# Patient Record
Sex: MALE | Race: WHITE | NOT HISPANIC OR LATINO | Employment: OTHER | URBAN - METROPOLITAN AREA
[De-identification: names, ages, dates, MRNs, and addresses within clinical notes are randomized per-mention and may not be internally consistent; named-entity substitution may affect disease eponyms.]

---

## 2021-01-04 ENCOUNTER — APPOINTMENT (OUTPATIENT)
Dept: CT IMAGING | Facility: HOSPITAL | Age: 81
End: 2021-01-04

## 2021-01-04 ENCOUNTER — HOSPITAL ENCOUNTER (OUTPATIENT)
Facility: HOSPITAL | Age: 81
Setting detail: OBSERVATION
Discharge: HOME | End: 2021-01-05
Attending: EMERGENCY MEDICINE | Admitting: HOSPITALIST

## 2021-01-04 ENCOUNTER — APPOINTMENT (OUTPATIENT)
Dept: GENERAL RADIOLOGY | Facility: HOSPITAL | Age: 81
End: 2021-01-04

## 2021-01-04 DIAGNOSIS — G45.9 TIA (TRANSIENT ISCHEMIC ATTACK): Primary | ICD-10-CM

## 2021-01-04 PROBLEM — R47.9 SPEECH DISTURBANCE: Chronic | Status: ACTIVE | Noted: 2021-01-04

## 2021-01-04 LAB
ABO GROUP BLD: NORMAL
ALBUMIN SERPL-MCNC: 4.2 G/DL (ref 3.5–5.2)
ALBUMIN/GLOB SERPL: 1.3 G/DL
ALP SERPL-CCNC: 102 U/L (ref 39–117)
ALT SERPL W P-5'-P-CCNC: 12 U/L (ref 1–41)
ANION GAP SERPL CALCULATED.3IONS-SCNC: 13 MMOL/L (ref 5–15)
APTT PPP: 30 SECONDS (ref 24–31)
AST SERPL-CCNC: 17 U/L (ref 1–40)
BASOPHILS # BLD AUTO: 0.1 10*3/MM3 (ref 0–0.2)
BASOPHILS NFR BLD AUTO: 1.2 % (ref 0–1.5)
BILIRUB SERPL-MCNC: 0.8 MG/DL (ref 0–1.2)
BLD GP AB SCN SERPL QL: NEGATIVE
BUN SERPL-MCNC: 24 MG/DL (ref 8–23)
BUN/CREAT SERPL: 19.7 (ref 7–25)
CALCIUM SPEC-SCNC: 9.9 MG/DL (ref 8.6–10.5)
CHLORIDE SERPL-SCNC: 105 MMOL/L (ref 98–107)
CO2 SERPL-SCNC: 19 MMOL/L (ref 22–29)
CREAT SERPL-MCNC: 1.22 MG/DL (ref 0.76–1.27)
DEPRECATED RDW RBC AUTO: 49 FL (ref 37–54)
EOSINOPHIL # BLD AUTO: 0.1 10*3/MM3 (ref 0–0.4)
EOSINOPHIL NFR BLD AUTO: 0.6 % (ref 0.3–6.2)
ERYTHROCYTE [DISTWIDTH] IN BLOOD BY AUTOMATED COUNT: 15.2 % (ref 12.3–15.4)
GFR SERPL CREATININE-BSD FRML MDRD: 57 ML/MIN/1.73
GFR SERPL CREATININE-BSD FRML MDRD: 69 ML/MIN/1.73
GLOBULIN UR ELPH-MCNC: 3.2 GM/DL
GLUCOSE SERPL-MCNC: 151 MG/DL (ref 65–99)
HCT VFR BLD AUTO: 44.7 % (ref 37.5–51)
HGB BLD-MCNC: 15.1 G/DL (ref 13–17.7)
HOLD SPECIMEN: NORMAL
INR PPP: 1.08 (ref 0.93–1.1)
LYMPHOCYTES # BLD AUTO: 2.3 10*3/MM3 (ref 0.7–3.1)
LYMPHOCYTES NFR BLD AUTO: 28.2 % (ref 19.6–45.3)
MCH RBC QN AUTO: 31.4 PG (ref 26.6–33)
MCHC RBC AUTO-ENTMCNC: 33.8 G/DL (ref 31.5–35.7)
MCV RBC AUTO: 92.8 FL (ref 79–97)
MONOCYTES # BLD AUTO: 0.6 10*3/MM3 (ref 0.1–0.9)
MONOCYTES NFR BLD AUTO: 7.4 % (ref 5–12)
NEUTROPHILS NFR BLD AUTO: 5.1 10*3/MM3 (ref 1.7–7)
NEUTROPHILS NFR BLD AUTO: 62.6 % (ref 42.7–76)
NRBC BLD AUTO-RTO: 0.1 /100 WBC (ref 0–0.2)
PLATELET # BLD AUTO: 268 10*3/MM3 (ref 140–450)
PMV BLD AUTO: 8.9 FL (ref 6–12)
POTASSIUM SERPL-SCNC: 4.6 MMOL/L (ref 3.5–5.2)
PROT SERPL-MCNC: 7.4 G/DL (ref 6–8.5)
PROTHROMBIN TIME: 11.8 SECONDS (ref 9.6–11.7)
QT INTERVAL: 396 MS
RBC # BLD AUTO: 4.82 10*6/MM3 (ref 4.14–5.8)
RH BLD: POSITIVE
SODIUM SERPL-SCNC: 137 MMOL/L (ref 136–145)
T&S EXPIRATION DATE: NORMAL
TROPONIN T SERPL-MCNC: <0.01 NG/ML (ref 0–0.03)
WBC # BLD AUTO: 8.2 10*3/MM3 (ref 3.4–10.8)
WHOLE BLOOD HOLD SPECIMEN: NORMAL
WHOLE BLOOD HOLD SPECIMEN: NORMAL

## 2021-01-04 PROCEDURE — 71045 X-RAY EXAM CHEST 1 VIEW: CPT

## 2021-01-04 PROCEDURE — 86901 BLOOD TYPING SEROLOGIC RH(D): CPT

## 2021-01-04 PROCEDURE — 93005 ELECTROCARDIOGRAM TRACING: CPT | Performed by: EMERGENCY MEDICINE

## 2021-01-04 PROCEDURE — 96360 HYDRATION IV INFUSION INIT: CPT

## 2021-01-04 PROCEDURE — 80053 COMPREHEN METABOLIC PANEL: CPT | Performed by: EMERGENCY MEDICINE

## 2021-01-04 PROCEDURE — G0378 HOSPITAL OBSERVATION PER HR: HCPCS

## 2021-01-04 PROCEDURE — 70496 CT ANGIOGRAPHY HEAD: CPT

## 2021-01-04 PROCEDURE — 82962 GLUCOSE BLOOD TEST: CPT

## 2021-01-04 PROCEDURE — 85025 COMPLETE CBC W/AUTO DIFF WBC: CPT | Performed by: EMERGENCY MEDICINE

## 2021-01-04 PROCEDURE — 84484 ASSAY OF TROPONIN QUANT: CPT | Performed by: EMERGENCY MEDICINE

## 2021-01-04 PROCEDURE — 85610 PROTHROMBIN TIME: CPT | Performed by: EMERGENCY MEDICINE

## 2021-01-04 PROCEDURE — U0004 COV-19 TEST NON-CDC HGH THRU: HCPCS | Performed by: EMERGENCY MEDICINE

## 2021-01-04 PROCEDURE — 86901 BLOOD TYPING SEROLOGIC RH(D): CPT | Performed by: EMERGENCY MEDICINE

## 2021-01-04 PROCEDURE — 85730 THROMBOPLASTIN TIME PARTIAL: CPT | Performed by: EMERGENCY MEDICINE

## 2021-01-04 PROCEDURE — 70450 CT HEAD/BRAIN W/O DYE: CPT

## 2021-01-04 PROCEDURE — 96361 HYDRATE IV INFUSION ADD-ON: CPT

## 2021-01-04 PROCEDURE — 86850 RBC ANTIBODY SCREEN: CPT | Performed by: EMERGENCY MEDICINE

## 2021-01-04 PROCEDURE — C9803 HOPD COVID-19 SPEC COLLECT: HCPCS | Performed by: EMERGENCY MEDICINE

## 2021-01-04 PROCEDURE — 70498 CT ANGIOGRAPHY NECK: CPT

## 2021-01-04 PROCEDURE — 86900 BLOOD TYPING SEROLOGIC ABO: CPT | Performed by: EMERGENCY MEDICINE

## 2021-01-04 PROCEDURE — 99285 EMERGENCY DEPT VISIT HI MDM: CPT

## 2021-01-04 PROCEDURE — 86900 BLOOD TYPING SEROLOGIC ABO: CPT

## 2021-01-04 PROCEDURE — 0 IOPAMIDOL PER 1 ML: Performed by: EMERGENCY MEDICINE

## 2021-01-04 PROCEDURE — 99219 PR INITIAL OBSERVATION CARE/DAY 50 MINUTES: CPT | Performed by: PHYSICIAN ASSISTANT

## 2021-01-04 RX ORDER — MAGNESIUM SULFATE HEPTAHYDRATE 40 MG/ML
2 INJECTION, SOLUTION INTRAVENOUS AS NEEDED
Status: DISCONTINUED | OUTPATIENT
Start: 2021-01-04 | End: 2021-01-05 | Stop reason: HOSPADM

## 2021-01-04 RX ORDER — POTASSIUM CHLORIDE 1.5 G/1.77G
40 POWDER, FOR SOLUTION ORAL AS NEEDED
Status: DISCONTINUED | OUTPATIENT
Start: 2021-01-04 | End: 2021-01-05 | Stop reason: HOSPADM

## 2021-01-04 RX ORDER — CALCIUM GLUCONATE 20 MG/ML
2 INJECTION, SOLUTION INTRAVENOUS AS NEEDED
Status: DISCONTINUED | OUTPATIENT
Start: 2021-01-04 | End: 2021-01-05 | Stop reason: HOSPADM

## 2021-01-04 RX ORDER — ASPIRIN 300 MG/1
300 SUPPOSITORY RECTAL DAILY
Status: DISCONTINUED | OUTPATIENT
Start: 2021-01-04 | End: 2021-01-05

## 2021-01-04 RX ORDER — DOCUSATE SODIUM 100 MG/1
100 CAPSULE, LIQUID FILLED ORAL 2 TIMES DAILY PRN
Status: DISCONTINUED | OUTPATIENT
Start: 2021-01-04 | End: 2021-01-05 | Stop reason: HOSPADM

## 2021-01-04 RX ORDER — SODIUM CHLORIDE 9 MG/ML
125 INJECTION, SOLUTION INTRAVENOUS CONTINUOUS
Status: DISCONTINUED | OUTPATIENT
Start: 2021-01-04 | End: 2021-01-05 | Stop reason: HOSPADM

## 2021-01-04 RX ORDER — MAGNESIUM SULFATE HEPTAHYDRATE 40 MG/ML
4 INJECTION, SOLUTION INTRAVENOUS AS NEEDED
Status: DISCONTINUED | OUTPATIENT
Start: 2021-01-04 | End: 2021-01-05 | Stop reason: HOSPADM

## 2021-01-04 RX ORDER — SODIUM CHLORIDE 0.9 % (FLUSH) 0.9 %
10 SYRINGE (ML) INJECTION EVERY 12 HOURS SCHEDULED
Status: DISCONTINUED | OUTPATIENT
Start: 2021-01-04 | End: 2021-01-05 | Stop reason: HOSPADM

## 2021-01-04 RX ORDER — ONDANSETRON 2 MG/ML
4 INJECTION INTRAMUSCULAR; INTRAVENOUS EVERY 6 HOURS PRN
Status: DISCONTINUED | OUTPATIENT
Start: 2021-01-04 | End: 2021-01-05 | Stop reason: HOSPADM

## 2021-01-04 RX ORDER — ATORVASTATIN CALCIUM 40 MG/1
80 TABLET, FILM COATED ORAL NIGHTLY
Status: DISCONTINUED | OUTPATIENT
Start: 2021-01-04 | End: 2021-01-05 | Stop reason: HOSPADM

## 2021-01-04 RX ORDER — SODIUM CHLORIDE 0.9 % (FLUSH) 0.9 %
10 SYRINGE (ML) INJECTION AS NEEDED
Status: DISCONTINUED | OUTPATIENT
Start: 2021-01-04 | End: 2021-01-05 | Stop reason: HOSPADM

## 2021-01-04 RX ORDER — CALCIUM GLUCONATE 20 MG/ML
1 INJECTION, SOLUTION INTRAVENOUS AS NEEDED
Status: DISCONTINUED | OUTPATIENT
Start: 2021-01-04 | End: 2021-01-05 | Stop reason: HOSPADM

## 2021-01-04 RX ORDER — POTASSIUM CHLORIDE 20 MEQ/1
40 TABLET, EXTENDED RELEASE ORAL AS NEEDED
Status: DISCONTINUED | OUTPATIENT
Start: 2021-01-04 | End: 2021-01-05 | Stop reason: HOSPADM

## 2021-01-04 RX ORDER — ASPIRIN 81 MG/1
81 TABLET, CHEWABLE ORAL DAILY
Status: DISCONTINUED | OUTPATIENT
Start: 2021-01-04 | End: 2021-01-05

## 2021-01-04 RX ORDER — ALUMINA, MAGNESIA, AND SIMETHICONE 2400; 2400; 240 MG/30ML; MG/30ML; MG/30ML
7.5 SUSPENSION ORAL EVERY 4 HOURS PRN
Status: DISCONTINUED | OUTPATIENT
Start: 2021-01-04 | End: 2021-01-05 | Stop reason: HOSPADM

## 2021-01-04 RX ADMIN — SODIUM CHLORIDE 125 ML/HR: 9 INJECTION, SOLUTION INTRAVENOUS at 19:32

## 2021-01-04 RX ADMIN — IOPAMIDOL 100 ML: 755 INJECTION, SOLUTION INTRAVENOUS at 18:07

## 2021-01-05 ENCOUNTER — APPOINTMENT (OUTPATIENT)
Dept: CARDIOLOGY | Facility: HOSPITAL | Age: 81
End: 2021-01-05

## 2021-01-05 ENCOUNTER — APPOINTMENT (OUTPATIENT)
Dept: MRI IMAGING | Facility: HOSPITAL | Age: 81
End: 2021-01-05

## 2021-01-05 VITALS
RESPIRATION RATE: 18 BRPM | HEART RATE: 91 BPM | WEIGHT: 202.6 LBS | OXYGEN SATURATION: 93 % | DIASTOLIC BLOOD PRESSURE: 78 MMHG | HEIGHT: 71 IN | SYSTOLIC BLOOD PRESSURE: 136 MMHG | BODY MASS INDEX: 28.36 KG/M2 | TEMPERATURE: 97.5 F

## 2021-01-05 PROBLEM — Z95.1 HX OF CABG: Status: ACTIVE | Noted: 2021-01-05

## 2021-01-05 PROBLEM — I48.91 ATRIAL FIBRILLATION (HCC): Status: ACTIVE | Noted: 2021-01-05

## 2021-01-05 PROBLEM — E11.21 DIABETES MELLITUS WITH NEPHROPATHY (HCC): Status: ACTIVE | Noted: 2021-01-05

## 2021-01-05 PROBLEM — I65.22 INTERNAL CAROTID ARTERY OCCLUSION, LEFT: Status: ACTIVE | Noted: 2021-01-05

## 2021-01-05 PROBLEM — J44.9 COPD (CHRONIC OBSTRUCTIVE PULMONARY DISEASE) (HCC): Status: ACTIVE | Noted: 2021-01-05

## 2021-01-05 PROBLEM — N40.0 BPH (BENIGN PROSTATIC HYPERPLASIA): Status: ACTIVE | Noted: 2021-01-05

## 2021-01-05 PROBLEM — I71.40 AAA (ABDOMINAL AORTIC ANEURYSM) (HCC): Status: ACTIVE | Noted: 2021-01-05

## 2021-01-05 PROBLEM — G43.909 MIGRAINE HEADACHE: Status: ACTIVE | Noted: 2021-01-05

## 2021-01-05 PROBLEM — R47.9 SPEECH DISTURBANCE: Chronic | Status: RESOLVED | Noted: 2021-01-04 | Resolved: 2021-01-05

## 2021-01-05 LAB
ALBUMIN SERPL-MCNC: 4 G/DL (ref 3.5–5.2)
ALBUMIN/GLOB SERPL: 1.5 G/DL
ALP SERPL-CCNC: 98 U/L (ref 39–117)
ALT SERPL W P-5'-P-CCNC: 8 U/L (ref 1–41)
ANION GAP SERPL CALCULATED.3IONS-SCNC: 11 MMOL/L (ref 5–15)
AST SERPL-CCNC: 21 U/L (ref 1–40)
BILIRUB SERPL-MCNC: 0.9 MG/DL (ref 0–1.2)
BUN SERPL-MCNC: 23 MG/DL (ref 8–23)
BUN/CREAT SERPL: 18.3 (ref 7–25)
CALCIUM SPEC-SCNC: 9.4 MG/DL (ref 8.6–10.5)
CHLORIDE SERPL-SCNC: 104 MMOL/L (ref 98–107)
CHOLEST SERPL-MCNC: 89 MG/DL (ref 0–200)
CO2 SERPL-SCNC: 21 MMOL/L (ref 22–29)
CREAT SERPL-MCNC: 1.26 MG/DL (ref 0.76–1.27)
DEPRECATED RDW RBC AUTO: 49 FL (ref 37–54)
ERYTHROCYTE [DISTWIDTH] IN BLOOD BY AUTOMATED COUNT: 15.3 % (ref 12.3–15.4)
GFR SERPL CREATININE-BSD FRML MDRD: 55 ML/MIN/1.73
GLOBULIN UR ELPH-MCNC: 2.7 GM/DL
GLUCOSE BLDC GLUCOMTR-MCNC: 148 MG/DL (ref 70–105)
GLUCOSE BLDC GLUCOMTR-MCNC: 166 MG/DL (ref 70–105)
GLUCOSE BLDC GLUCOMTR-MCNC: 203 MG/DL (ref 70–105)
GLUCOSE SERPL-MCNC: 216 MG/DL (ref 65–99)
HBA1C MFR BLD: 6.5 % (ref 3.5–5.6)
HCT VFR BLD AUTO: 42.5 % (ref 37.5–51)
HDLC SERPL-MCNC: 40 MG/DL (ref 40–60)
HGB BLD-MCNC: 14.2 G/DL (ref 13–17.7)
LDLC SERPL CALC-MCNC: 30 MG/DL (ref 0–100)
LDLC/HDLC SERPL: 0.74 {RATIO}
MCH RBC QN AUTO: 30.6 PG (ref 26.6–33)
MCHC RBC AUTO-ENTMCNC: 33.4 G/DL (ref 31.5–35.7)
MCV RBC AUTO: 91.7 FL (ref 79–97)
PLATELET # BLD AUTO: 231 10*3/MM3 (ref 140–450)
PMV BLD AUTO: 8.9 FL (ref 6–12)
POTASSIUM SERPL-SCNC: 3.5 MMOL/L (ref 3.5–5.2)
PROT SERPL-MCNC: 6.7 G/DL (ref 6–8.5)
RBC # BLD AUTO: 4.63 10*6/MM3 (ref 4.14–5.8)
SARS-COV-2 ORF1AB RESP QL NAA+PROBE: NOT DETECTED
SODIUM SERPL-SCNC: 136 MMOL/L (ref 136–145)
TRIGL SERPL-MCNC: 98 MG/DL (ref 0–150)
TSH SERPL DL<=0.05 MIU/L-ACNC: 3.62 UIU/ML (ref 0.27–4.2)
VIT B12 BLD-MCNC: 573 PG/ML (ref 211–946)
VLDLC SERPL-MCNC: 19 MG/DL (ref 5–40)
WBC # BLD AUTO: 8 10*3/MM3 (ref 3.4–10.8)

## 2021-01-05 PROCEDURE — 80061 LIPID PANEL: CPT | Performed by: PHYSICIAN ASSISTANT

## 2021-01-05 PROCEDURE — G0378 HOSPITAL OBSERVATION PER HR: HCPCS

## 2021-01-05 PROCEDURE — 85027 COMPLETE CBC AUTOMATED: CPT | Performed by: PHYSICIAN ASSISTANT

## 2021-01-05 PROCEDURE — 83036 HEMOGLOBIN GLYCOSYLATED A1C: CPT | Performed by: PHYSICIAN ASSISTANT

## 2021-01-05 PROCEDURE — 99214 OFFICE O/P EST MOD 30 MIN: CPT | Performed by: NURSE PRACTITIONER

## 2021-01-05 PROCEDURE — 97535 SELF CARE MNGMENT TRAINING: CPT

## 2021-01-05 PROCEDURE — 82962 GLUCOSE BLOOD TEST: CPT

## 2021-01-05 PROCEDURE — 99217 PR OBSERVATION CARE DISCHARGE MANAGEMENT: CPT | Performed by: HOSPITALIST

## 2021-01-05 PROCEDURE — 80053 COMPREHEN METABOLIC PANEL: CPT | Performed by: PHYSICIAN ASSISTANT

## 2021-01-05 PROCEDURE — 97161 PT EVAL LOW COMPLEX 20 MIN: CPT

## 2021-01-05 PROCEDURE — 97165 OT EVAL LOW COMPLEX 30 MIN: CPT

## 2021-01-05 PROCEDURE — 70551 MRI BRAIN STEM W/O DYE: CPT

## 2021-01-05 PROCEDURE — 84443 ASSAY THYROID STIM HORMONE: CPT | Performed by: PHYSICIAN ASSISTANT

## 2021-01-05 PROCEDURE — 82607 VITAMIN B-12: CPT | Performed by: PHYSICIAN ASSISTANT

## 2021-01-05 RX ORDER — POLYETHYLENE GLYCOL 3350 17 G/17G
17 POWDER, FOR SOLUTION ORAL DAILY PRN
COMMUNITY

## 2021-01-05 RX ORDER — INSULIN GLARGINE 100 [IU]/ML
18 INJECTION, SOLUTION SUBCUTANEOUS NIGHTLY
COMMUNITY

## 2021-01-05 RX ORDER — CARBOXYMETHYLCELLULOSE SODIUM 5 MG/ML
1 SOLUTION/ DROPS OPHTHALMIC 4 TIMES DAILY PRN
COMMUNITY

## 2021-01-05 RX ORDER — TAMSULOSIN HYDROCHLORIDE 0.4 MG/1
2 CAPSULE ORAL NIGHTLY
COMMUNITY

## 2021-01-05 RX ORDER — ECHINACEA PURPUREA EXTRACT 125 MG
1 TABLET ORAL EVERY 6 HOURS PRN
COMMUNITY

## 2021-01-05 RX ORDER — CLOPIDOGREL BISULFATE 75 MG/1
75 TABLET ORAL DAILY
Status: DISCONTINUED | OUTPATIENT
Start: 2021-01-05 | End: 2021-01-05 | Stop reason: HOSPADM

## 2021-01-05 RX ORDER — FERROUS GLUCONATE 324(37.5)
324 TABLET ORAL EVERY OTHER DAY
COMMUNITY

## 2021-01-05 RX ORDER — GABAPENTIN 100 MG/1
200 CAPSULE ORAL 2 TIMES DAILY
COMMUNITY

## 2021-01-05 RX ORDER — ALBUTEROL SULFATE 90 UG/1
2 AEROSOL, METERED RESPIRATORY (INHALATION) EVERY 6 HOURS PRN
COMMUNITY

## 2021-01-05 RX ORDER — ATORVASTATIN CALCIUM 40 MG/1
40 TABLET, FILM COATED ORAL DAILY
COMMUNITY

## 2021-01-05 RX ORDER — METOPROLOL SUCCINATE 25 MG/1
12.5 TABLET, EXTENDED RELEASE ORAL DAILY
COMMUNITY

## 2021-01-05 RX ORDER — AMOXICILLIN 250 MG
1 CAPSULE ORAL NIGHTLY PRN
COMMUNITY

## 2021-01-05 RX ORDER — MELATONIN
1000 DAILY
COMMUNITY

## 2021-01-05 RX ORDER — CLOPIDOGREL BISULFATE 75 MG/1
75 TABLET ORAL DAILY
Qty: 30 TABLET | Refills: 0 | Status: SHIPPED | OUTPATIENT
Start: 2021-01-06 | End: 2021-01-05

## 2021-01-05 RX ORDER — ACETAMINOPHEN 325 MG/1
650 TABLET ORAL EVERY 6 HOURS PRN
COMMUNITY

## 2021-01-05 RX ORDER — FINASTERIDE 5 MG/1
5 TABLET, FILM COATED ORAL DAILY
COMMUNITY

## 2021-01-05 RX ORDER — CLOPIDOGREL BISULFATE 75 MG/1
75 TABLET ORAL DAILY
Qty: 30 TABLET | Refills: 0 | Status: SHIPPED | OUTPATIENT
Start: 2021-01-06

## 2021-01-05 RX ADMIN — Medication 10 ML: at 00:33

## 2021-01-05 RX ADMIN — CLOPIDOGREL BISULFATE 75 MG: 75 TABLET ORAL at 12:13

## 2021-01-05 RX ADMIN — ASPIRIN 81 MG CHEWABLE TABLET 81 MG: 81 TABLET CHEWABLE at 00:32

## 2021-01-05 RX ADMIN — ATORVASTATIN CALCIUM 80 MG: 40 TABLET, FILM COATED ORAL at 00:32

## 2021-01-05 NOTE — DISCHARGE SUMMARY
[facility] Medicine Services  DISCHARGE SUMMARY        Prepared For PCP:  Provider, No Known    Patient Name: Lucio Valladares  : 1940  MRN: [id number]      Date of Admission:   2021    Date of Discharge:  2021    Length of stay:  LOS: 0 days     Hospital Course     Presenting Problem:   TIA (transient ischemic attack) [G45.9]      Active Hospital Problems    Diagnosis  POA   • **TIA (transient ischemic attack) [G45.9]  Yes     Priority: High   • Diabetes mellitus with nephropathy (CMS/HCC) [E11.21]  Yes     Priority: Medium   • BPH (benign prostatic hyperplasia) [N40.0]  Yes     Priority: Medium   • Hx of CABG [Z95.1]  Not Applicable     Priority: Medium   • AAA (abdominal aortic aneurysm) (CMS/[facility]) [I71.4]  Yes     Priority: Medium   • Migraine headache [G43.909]  Yes     Priority: Medium   • Internal carotid artery occlusion, left [I65.22]  Yes     Priority: Medium   • COPD (chronic obstructive pulmonary disease) (CMS/[facility]) [J44.9]  Yes     Priority: Medium   • Atrial fibrillation (CMS/[facility]) [I48.91]  Yes     Priority: Medium      Resolved Hospital Problems    Diagnosis Date Resolved POA   • Speech disturbance [R47.9] 2021 Yes     Priority: High           Hospital Course:    The patient was placed on observation.  CTA head/neck were done and showed known occlusion of the left ICA and 50% occlusion of right ICA.  MRI of the brain ruled out acute infarct but showed old infarct in the right frontal lobe.  PT/OT has evaluated the patient.  The patient at baseline uses wheelchair/scooter for several years and has issues with chronic knee and ankle pain.  The patient will be discharged back to [facility] on 2021.  The patient's daughter agrees with plan of care.  The patient can follow-up with neuro interventionist regarding left ICA occlusion.    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:  -The patient had followed up with surgeon 2019 and determined to be poor surgical candidate according to daughter    BPH:  -Continue Flomax and finasteride    IDDM with peripheral neuropathy:  -Continue Metformin, Lantus and gabapentin  -Hemoglobin A1c 6.5    COPD:  -Not in exacerbation  -Quit smoking 2014  -Continue bronchodilators    Chronic LE pain/gait dysfunction:  -Patient has history of knee repair, knee DJD and ankle pain  -Uses wheelchair/walker to ambulate for many years    Dementia:  -Mild described by daughter             Recommendation for Outpatient Providers:             Reasons For Change In Medications and Indications for New Medications:        Day of Discharge     HPI:     Mr. Valladares is a 80 y.o. male with an uncertain past medical history who presents to [facility] after family noticed what they felt was a change in his speech.  Patient is alert and oriented x2 and appears somewhat agitated that he is at the hospital.  He states that his daughter is his POA although he did not want her to be and she is trying to interfere with his life including preventing him from traveling because she \"thinks I have dementia\".  At the time my exam patient speech does appear mildly slurred though he states that it is at baseline and denies any complaint including focal weakness, recent falls, headache or any other pain, dyspnea, nausea vomiting, change in bowel or bladder habits, sensation of tachycardia or palpitations, syncope or near syncope.  He " notes that he had a previous CVA approximately 2 to 3 years ago and uses some mobility aids at home including a cane and a walker but is otherwise able to live independently with his daughter checking on him periodically.  He cannot provide any further information regarding his medical history or medications he takes regularly other than to say [facility]     In the ED patient had lab significant for creatinine: 1.22, BUN: 24, BUN/creatinine ratio: 19.7, glucose: 151 remainder of CBC and CMP was generally unremarkable.  INR: 1.08, PT: 11.8, PTT: 30.0.  Chest x-ray shows no acute cardiopulmonary abnormality.  CT of head without contrast shows no acute intracranial abnormality with a remote infarct at the right frontal lobe noted as well as mild age-appropriate volume loss with a moderate amount of low-density in the periventricular subcortical white matter.  Radiologist notes this is nonspecific but most commonly with a result of small vessel ischemic changes.  CTA of head and neck is pending.  EKG shows sinus rhythm at 67 without obvious acute ST changes or ectopy and a QTC of 411 ms.       Vital Signs:   Temp:  [97.3 °F (36.3 °C)-98.9 °F (37.2 °C)] 97.5 °F (36.4 °C)  Heart Rate:  [] 91  Resp:  [13-22] 18  BP: (105-154)/(59-87) 136/78     Physical Exam:  Physical Exam  HENT:      Head: Normocephalic.      Mouth/Throat:      Mouth: Mucous membranes are moist.   Eyes:      General: No scleral icterus.     Extraocular Movements: Extraocular movements intact.      Pupils: Pupils are equal, round, and reactive to light.   Neck:      Musculoskeletal: Normal range of motion.   Cardiovascular:      Rate and Rhythm: Normal rate and regular rhythm.   Pulmonary:      Effort: Pulmonary effort is normal.      Breath sounds: Normal breath sounds.   Abdominal:      General: Bowel sounds are normal.      Palpations: Abdomen is soft.   Neurological:      Mental Status: He is alert.    Psychiatric:      Comments: dementia         Pertinent  and/or Most Recent Results     Results from last 7 days   Lab Units 01/05/21  0445 01/05/21  0444 01/04/21  1802   WBC 10*3/mm3 8.00  --  8.20   HEMOGLOBIN g/dL 14.2  --  15.1   HEMATOCRIT % 42.5  --  44.7   PLATELETS 10*3/mm3 231  --  268   SODIUM mmol/L  --  136 137   POTASSIUM mmol/L  --  3.5 4.6   CHLORIDE mmol/L  --  104 105   CO2 mmol/L  --  21.0* 19.0*   BUN mg/dL  --  23 24*   CREATININE mg/dL  --  1.26 1.22   GLUCOSE mg/dL  --  216* 151*   CALCIUM mg/dL  --  9.4 9.9     Results from last 7 days   Lab Units 01/05/21  0444 01/04/21  1802   BILIRUBIN mg/dL 0.9 0.8   ALK PHOS U/L 98 102   ALT (SGPT) U/L 8 12   AST (SGOT) U/L 21 17   PROTIME Seconds  --  11.8*   INR   --  1.08   APTT seconds  --  30.0     Results from last 7 days   Lab Units 01/05/21  0444   CHOLESTEROL mg/dL 89   TRIGLYCERIDES mg/dL 98   HDL CHOL mg/dL 40     Results from last 7 days   Lab Units 01/05/21  0445 01/05/21  0444 01/04/21  1802   TSH uIU/mL  --  3.620  --    HEMOGLOBIN A1C % 6.5*  --   --    TROPONIN T ng/mL  --   --  <0.010       Brief Urine Lab Results     None          Microbiology Results Abnormal     None          Ct Angiogram Neck    Result Date: 1/5/2021  Impression:  1. No obvious acute large vessel cerebral arterial occlusions appreciated. 2. Complete occlusion, left internal carotid artery, age indeterminate. 3. Absence versus occlusion, terminal most left vertebral artery. The right vertebral artery is widely patent and dominant in caliber. The basilar artery is also widely patent. 4. Moderate grade stenosis, prevertebral proximal left subclavian artery. 5. Additional findings, both vascular and nonvascular, as described above detail.  Electronically Signed By-Heath Allan MD On:1/5/2021 7:53 AM This report was finalized on [id number] by  Heath Allan MD.    Mri Brain Without Contrast    Result Date: 1/5/2021  Impression:  1. No acute intracranial  abnormality. 2. Advanced chronic small vessel ischemic change. 3. Chronic right frontal lobe infarct. 4. Redemonstration of left internal carotid artery occlusion as seen on CTA. 5. Small left mastoid effusion.  Electronically Signed By-Jesus Meeks MD On:1/5/2021 11:57 AM This report was finalized on [id number] by  Jesus Meeks MD.    Xr Chest 1 View    Result Date: 1/4/2021  Impression: No acute cardiopulmonary abnormality.  Electronically Signed By-Misti Spain MD On:1/4/2021 6:46 PM This report was finalized on [id number] by  Misti Spain MD.    Ct Angiogram Head    Result Date: 1/5/2021  Impression:  1. No obvious acute large vessel cerebral arterial occlusions appreciated. 2. Complete occlusion, left internal carotid artery, age indeterminate. 3. Absence versus occlusion, terminal most left vertebral artery. The right vertebral artery is widely patent and dominant in caliber. The basilar artery is also widely patent. 4. Moderate grade stenosis, prevertebral proximal left subclavian artery. 5. Additional findings, both vascular and nonvascular, as described above detail.  Electronically Signed By-Heath Allan MD On:1/5/2021 7:53 AM This report was finalized on [id number] by  Heath Allan MD.    Ct Head Without Contrast Stroke Protocol    Result Date: 1/4/2021  Impression: No acute intracranial abnormality.. There is remote infarct at the right frontal lobe. There is mild age-appropriate volume loss and there is a moderate amount of low-density in the periventricular subcortical white matter. This low density is nonspecific, but most commonly seen with chronic small vessel ischemic change. Brain MRI is more sensitive to evaluate for acute or subacute infarcts and to evaluate for intracranial metastatic disease.  Electronically Signed By-Misti Spain MD On:1/4/2021 6:08 PM This report was finalized on [id number] by  Misti Spain MD.                             Test Results Pending at  Discharge  Pending Labs     Order Current Status    COVID PRE-OP / PRE-PROCEDURE SCREENING ORDER (NO ISOLATION) - Swab, Nasopharynx In process    COVID-19,APTIMA PANTHER,[facility] IN-HOUSE, NP/OP SWAB IN UTM/VTM/SALINE TRANSPORT MEDIA,24 HR TAT - Swab, Nasopharynx In process            Procedures Performed           Consults:   Consults     Date and Time Order Name Status Description    1/4/2021 1944 Hospitalist (on-call MD unless specified) Completed     1/4/2021 1758 Inpatient Neurology Consult Stroke Completed     1/4/2021 1758 Inpatient Neurology Consult Stroke Completed             Discharge Details        Discharge Medications      New Medications      Instructions Start Date   clopidogrel 75 MG tablet  Commonly known as: PLAVIX   75 mg, Oral, Daily   Start Date: January 6, 2021        Continue These Medications      Instructions Start Date   acetaminophen 325 MG tablet  Commonly known as: TYLENOL   650 mg, Oral, Every 6 Hours PRN      albuterol sulfate  (90 Base) MCG/ACT inhaler  Commonly known as: PROVENTIL HFA;VENTOLIN HFA;PROAIR HFA   2 puffs, Inhalation, Every 6 Hours PRN      apixaban 5 MG tablet tablet  Commonly known as: ELIQUIS   5 mg, Oral, 2 Times Daily      atorvastatin 40 MG tablet  Commonly known as: LIPITOR   40 mg, Oral, Daily      carboxymethylcellulose 0.5 % solution  Commonly known as: REFRESH PLUS   1 drop, Both Eyes, 4 Times Daily PRN      cholecalciferol 25 MCG (1000 UT) tablet  Commonly known as: VITAMIN D3   1,000 Units, Oral, Daily      Diclofenac Sodium 1 % gel gel  Commonly known as: VOLTAREN   4 g, Topical, 4 Times Daily PRN      ferrous gluconate 324 (37.5 Fe) MG tablet tablet   324 mg, Oral, Every Other Day      finasteride 5 MG tablet  Commonly known as: PROSCAR   5 mg, Oral, Daily      gabapentin 100 MG capsule  Commonly known as: NEURONTIN   200 mg, Oral, 2 times daily      insulin glargine 100 UNIT/ML injection  Commonly known as: LANADELINA MANSFIELDGLEE   18 Units, Subcutaneous,  Nightly      metFORMIN 500 MG tablet  Commonly known as: GLUCOPHAGE   500 mg, Oral, 2 Times Daily With Meals      metoprolol succinate XL 25 MG 24 hr tablet  Commonly known as: TOPROL-XL   12.5 mg, Oral, Daily      polyethylene glycol 17 g packet  Commonly known as: MIRALAX   17 g, Oral, Daily PRN      sennosides-docusate 8.6-50 MG per tablet  Commonly known as: PERICOLACE   1 tablet, Oral, Nightly PRN      sodium chloride 0.65 % nasal spray   1 spray, Nasal, Every 6 Hours PRN      tamsulosin 0.4 MG capsule 24 hr capsule  Commonly known as: FLOMAX   2 capsules, Oral, Nightly      tiotropium bromide-olodaterol 2.5-2.5 MCG/ACT aerosol solution inhaler  Commonly known as: STIOLTO RESPIMAT   2 puffs, Inhalation, Daily - RT             No Known Allergies      Discharge Disposition:  Home or Self Care    Diet:  Hospital:  Diet Order   Procedures   • Diet Regular         Discharge Activity:         CODE STATUS:    Code Status and Medical Interventions:   Ordered at: 01/04/21 2015     Code Status:    CPR     Medical Interventions (Level of Support Prior to Arrest):    Full         Follow-up Appointments  No future appointments.    Additional Instructions for the Follow-ups that You Need to Schedule     Discharge Follow-up with PCP   As directed       Currently Documented PCP:    Provider, No Known    PCP Phone Number:    None     Follow Up Details: 2 weeks         Discharge Follow-up with Specified Provider: neuro-interventionist at [facility]; 3 Weeks   As directed      To: neuro-interventionist at [facility]    Follow Up: 3 Weeks    Follow Up Details: Internal carotid occlusion                 Condition on Discharge:      Stable      This patient has been examined wearing appropriate Personal Protective Equipment and discussed with nursing. 01/05/21      Electronically signed by Kvng Pack DO, 01/05/21, 2:12 PM EST.      Time: I spent  15  minutes on this discharge activity which included  face-to-face encounter with the patient/reviewing the data in the system/coordination of the care with the nursing staff as well as consultants/documentation/entering orders.

## 2021-01-05 NOTE — THERAPY EVALUATION
Patient Name: Lucio Valladares  : 1940    MRN: [id number]                              Today's Date: 2021       Admit Date: 2021    Visit Dx:     ICD-10-CM ICD-9-CM   1. TIA (transient ischemic attack)  G45.9 435.9     Patient Active Problem List   Diagnosis   • TIA (transient ischemic attack)   • Diabetes mellitus with nephropathy (CMS/HCC)   • BPH (benign prostatic hyperplasia)     History reviewed. No pertinent past medical history.  History reviewed. No pertinent surgical history.  General Information     Row Name 21 1354          OT Time and Intention    Document Type  evaluation  -ES     Mode of Treatment  occupational therapy  -ES     Row Name 21 [facility]          General Information    Patient Profile Reviewed  yes  -ES     Existing Precautions/Restrictions  fall  -ES     Barriers to Rehab  cognitive status  -ES     Row Name 21 [facility]          Occupational Profile    Reason for Services/Referral (Occupational Profile)  Pt is 79 yo male admitted for dysarthria from [facility]. CT and MRI (-) for acute infarct. Remote infarct to R frontal lobe.  -ES     Successful Occupations (Occupational Profile)  Reports being [place] [employer]  -ES     Row Name 21 1353          Living Environment    Lives With  facility resident [facility]  -     Row Name 21 135          Home Main Entrance    Number of Stairs, Main Entrance  none  -ES     Row Name 21 1354          Stairs Within Home, Primary    Number of Stairs, Within Home, Primary  none  -ES     Row Name 21 1355          Cognition    Orientation Status (Cognition)  oriented to;person  -ES     Row Name 21 1356          Safety Issues, Functional Mobility    Safety Issues Affecting Function (Mobility)  awareness of need for assistance;ability to follow commands;insight into deficits/self-awareness;positioning of assistive device;safety precaution awareness;steps too close to assistive device;sequencing abilities  -ES      Impairments Affecting Function (Mobility)  balance;cognition  -ES     Cognitive Impairments, Mobility Safety/Performance  attention;impulsivity;insight into deficits/self-awareness;judgment;problem-solving/reasoning;safety precaution awareness;safety precaution follow-through  -ES       User Key  (r) = Recorded By, (t) = Taken By, (c) = Cosigned By    Initials Name Provider Type    Cierra Chung OT Occupational Therapist          Mobility/ADL's     Row Name 01/05/21 1358          Bed Mobility    Comment (Bed Mobility)  Up in chair upon OT arrival  -     Row Name 01/05/21 1358          Transfers    Bed-Chair [place] (Transfers)  minimum assist (75% patient effort)  -ES     Sit-Stand [place] (Transfers)  minimum assist (75% patient effort);verbal cues;nonverbal cues (demo/gesture)  -     Row Name 01/05/21 1358          Sit-Stand Transfer    Assistive Device (Sit-Stand Transfers)  walker, front-wheeled  -     Row Name 01/05/21 1358          Functional Mobility    Functional Mobility- Ind. Level  minimum assist (75% patient effort)  -     Functional Mobility- Device  rolling walker  -     Row Name 01/05/21 1358          Activities of Daily Living    BADL Assessment/Intervention  upper body dressing;lower body dressing  -     Row Name 01/05/21 1358          Upper Body Dressing Assessment/Training    [place] Level (Upper Body Dressing)  minimum assist (75% patient effort)  -     Row Name 01/05/21 1358          Lower Body Dressing Assessment/Training    [place] Level (Lower Body Dressing)  moderate assist (50% patient effort)  -ES       User Key  (r) = Recorded By, (t) = Taken By, (c) = Cosigned By    Initials Name Provider Type    Cierra Chung OT Occupational Therapist        Obj/Interventions     Row Name 01/05/21 1400          Sensory Assessment (Somatosensory)    Sensory Assessment (Somatosensory)  -- responsive to light touch, appears grossly WFL  -     Row Name  01/05/21 1400          Vision Assessment/Intervention    Visual Impairment/Limitations  -- unable to assess secondary to cognitive deficits  -ES     Row Name 01/05/21 1400          Range of Motion Comprehensive    General Range of Motion  no range of motion deficits identified  -ES     Row Name 01/05/21 1400          Strength Comprehensive (MMT)    General Manual Muscle Testing (MMT) Assessment  no strength deficits identified  -ES     Row Name 01/05/21 1400          Balance    Balance Assessment  sitting static balance;sitting dynamic balance;sit to stand dynamic balance;standing static balance;standing dynamic balance  -ES     Static Sitting Balance  WNL  -ES     Dynamic Sitting Balance  WNL  -ES     Sit to Stand Dynamic Balance  mild impairment  -ES     Static Standing Balance  mild impairment  -ES     Dynamic Standing Balance  mild impairment  -ES       User Key  (r) = Recorded By, (t) = Taken By, (c) = Cosigned By    Initials Name Provider Type    Cierra Chung OT Occupational Therapist        Goals/Plan     Row Name 01/05/21 1405          Dressing Goal 1 (OT)    Activity/Device (Dressing Goal 1, OT)  lower body dressing  -ES     [place]/Cues Needed (Dressing Goal 1, OT)  contact guard assist  -ES     Time Frame (Dressing Goal 1, OT)  2 weeks  -ES     Row Name 01/05/21 1405          Toileting Goal 1 (OT)    Activity/Device (Toileting Goal 1, OT)  toileting skills, all  -ES     [place] Level/Cues Needed (Toileting Goal 1, OT)  supervision required  -ES     Time Frame (Toileting Goal 1, OT)  2 weeks  -ES     Row Name 01/05/21 1405          Grooming Goal 1 (OT)    Activity/Device (Grooming Goal 1, OT)  grooming skills, all  -ES     [place] (Grooming Goal 1, OT)  modified independence  -ES     Time Frame (Grooming Goal 1, OT)  2 weeks  -ES       User Key  (r) = Recorded By, (t) = Taken By, (c) = Cosigned By    Initials Name Provider Type    Cierra Chung OT Occupational Therapist     "    Clinical Impression     Row Name 01/05/21 1400          Pain Scale: Numbers Pre/Post-Treatment    Pretreatment Pain Rating  0/10 - no pain  -ES     Posttreatment Pain Rating  0/10 - no pain  -ES     Row Name 01/05/21 1400          Plan of Care Review    Plan of Care Reviewed With  patient  -ES     Outcome Summary  Pt is 79 yo male admitted for dysarthria from [facility]. CT and MRI (-) for acute infarct. Remote infarct to R frontal lobe.  Pt pleasantly confused, and difficult to obtain PLOF.  He is oriented to self, and states he is at \"[employer]\" and is loquatious but unable to maintain attention to task.  Pt requires Min A for functional transfer and mobility, and demos poor safety awareness. He would benefit from IP rehab to addressed safety impairments. PPE: Gloves, mask, eyewear  -ES     Row Name 01/05/21 1400          Therapy Assessment/Plan (OT)    Rehab Potential (OT)  good, to achieve stated therapy goals  -ES     Criteria for Skilled Therapeutic Interventions Met (OT)  yes  -ES     Therapy Frequency (OT)  5 times/wk  -ES     Row Name 01/05/21 1400          Therapy Plan Review/Discharge Plan (OT)    Anticipated Discharge Disposition (OT)  inpatient rehabilitation facility  -ES     Row Name 01/05/21 1400          Vital Signs    Pre SpO2 (%)  95  -ES     O2 Delivery Pre Treatment  room air  -ES     Pre Patient Position  Sitting  -ES     Intra Patient Position  Standing  -ES     Post Patient Position  Sitting  -ES     Recovery Time  VSS  -ES     Row Name 01/05/21 1400          Positioning and Restraints    Pre-Treatment Position  sitting in chair/recliner  -ES     Post Treatment Position  chair  -ES     In Chair  notified nsg;exit alarm on;with family/caregiver;with OT  -ES       User Key  (r) = Recorded By, (t) = Taken By, (c) = Cosigned By    Initials Name Provider Type    Cierra Chung, OT Occupational Therapist        Outcome Measures     Row Name 01/05/21 1407          How much help from another " "is currently needed...    Putting on and taking off regular lower body clothing?  3  -ES     Bathing (including washing, rinsing, and drying)  3  -ES     Toileting (which includes using toilet bed pan or urinal)  3  -ES     Putting on and taking off regular upper body clothing  3  -ES     Taking care of personal grooming (such as brushing teeth)  3  -ES     Eating meals  4  -ES     AM-PAC 6 Clicks Score (OT)  19  -ES     Row Name 01/05/21 1405          Functional Assessment    Outcome Measure Options  AM-PAC 6 Clicks Daily Activity (OT)  -ES       User Key  (r) = Recorded By, (t) = Taken By, (c) = Cosigned By    Initials Name Provider Type    Cierra Chung OT Occupational Therapist          OT Recommendation and Plan  Therapy Frequency (OT): 5 times/wk  Plan of Care Review  Plan of Care Reviewed With: patient  Outcome Summary: Pt is 81 yo male admitted for dysarthria from [facility]. CT and MRI (-) for acute infarct. Remote infarct to R frontal lobe.  Pt pleasantly confused, and difficult to obtain PLOF.  He is oriented to self, and states he is at \"[employer]\" and is loquatious but unable to maintain attention to task.  Pt requires Min A for functional transfer and mobility, and demos poor safety awareness. He would benefit from IP rehab to addressed safety impairments. PPE: Gloves, mask, eyewear     Time Calculation:   Time Calculation- OT     Row Name 01/05/21 1406             Time Calculation- OT    OT Start Time  1215  -ES      OT Stop Time  1240  -ES      OT Time Calculation (min)  25 min  -ES      Total Timed Code Minutes- OT  10 minute(s)  -ES      OT Non-Billable Time (min)  15 min  -ES      OT Received On  01/05/21  -ES      OT - Next Appointment  01/06/21  -ES      OT Goal Re-Cert Due Date  01/19/21  -ES        User Key  (r) = Recorded By, (t) = Taken By, (c) = Cosigned By    Initials Name Provider Type    Cierra Chung OT Occupational Therapist        Therapy Charges for Today     Code " Description Service Date Service Provider Modifiers Qty    62808902479 HC OT SELF CARE/MGMT/TRAIN EA 15 MIN 1/5/2021 Cierra Cantu OT GO 1    95756820455 HC OT EVAL LOW COMPLEXITY 3 1/5/2021 Cierra Cantu OT GO 1               Cierra Cantu OT  1/5/2021

## 2021-01-05 NOTE — THERAPY TREATMENT NOTE
Patient Name: Lucio Valladares  : 1940    MRN: [id number]                              Today's Date: 2021       Admit Date: 2021    Visit Dx:     ICD-10-CM ICD-9-CM   1. TIA (transient ischemic attack)  G45.9 435.9     Patient Active Problem List   Diagnosis   • Speech disturbance   • TIA (transient ischemic attack)     History reviewed. No pertinent past medical history.  History reviewed. No pertinent surgical history.  General Information     Row Name 21 1156          Physical Therapy Time and Intention    Document Type  evaluation  -CM     Mode of Treatment  physical therapy  -     Row Name 21 1156          General Information    Patient Profile Reviewed  yes  -CM     Prior Level of Function  gait uses rw for mobility at [facility]; level of assist unknown  -CM     Existing Precautions/Restrictions  fall  -CM     Barriers to Rehab  cognitive status  -CM     Row Name 21 1156          Living Environment    Lives With  facility resident  -     Row Name 21 1156          Home Main Entrance    Number of Stairs, Main Entrance  none  -CM     Row Name 21 1156          Stairs Within Home, Primary    Number of Stairs, Within Home, Primary  none  -CM     Row Name 21 1156          Cognition    Orientation Status (Cognition)  disoriented to;person;place;situation;time very pleasantly confused; talks about his  wife, [place]; states she comes to see him sometimes  -CM     Row Name 21 1200 21 1156       Safety Issues, Functional Mobility    Safety Issues Affecting Function (Mobility)  --  impulsivity;insight into deficits/self-awareness;positioning of assistive device;safety precaution awareness;safety precautions follow-through/compliance;judgment;problem-solving;awareness of need for assistance;at risk behavior observed  -CM    Impairments Affecting Function (Mobility)  balance;cognition  -CM  balance;cognition;postural/trunk control  -CM      User Key  (r) =  Recorded By, (t) = Taken By, (c) = Cosigned By    Initials Name Provider Type    Wendi Parks PT Physical Therapist        Mobility     Row Name 01/05/21 1200          Bed Mobility    Bed Mobility  bed mobility (all) activities;supine-sit;sit-supine  -CM     Comment (Bed Mobility)  already in chair when PT arrived; RN reports pt stood and amb to door of room earlier w/o assistance but was very unsteady  -CM     Row Name 01/05/21 1200          Transfers    Comment (Transfers)  needs cuing for proper hand position and wt shifting anteriorly w/ coming to stand; has posterior loss of balance upon coming to stand.  -CM     Row Name 01/05/21 1200          Bed-Chair Transfer    Bed-Chair Kay (Transfers)  not tested  -CM     Row Name 01/05/21 1200          Sit-Stand Transfer    Sit-Stand Kay (Transfers)  minimum assist (75% patient effort);verbal cues;nonverbal cues (demo/gesture)  -CM     Assistive Device (Sit-Stand Transfers)  walker, front-wheeled  -CM     Row Name 01/05/21 1203 01/05/21 1200       Gait/Stairs (Locomotion)    Kay Level (Gait)  --  contact guard;1 person assist  -CM    Assistive Device (Gait)  --  walker, front-wheeled  -CM    Distance in Feet (Gait)  50 ft; mild posterior lean w/ loss of balance intermittently  -CM  40 ft; mild posterior lean w/ loss of balance intermittently  -CM    Deviations/Abnormal Patterns (Gait)  --  bilateral deviations  -CM      User Key  (r) = Recorded By, (t) = Taken By, (c) = Cosigned By    Initials Name Provider Type    Wendi Parks PT Physical Therapist        Obj/Interventions     Row Name 01/05/21 1202          Range of Motion Comprehensive    General Range of Motion  no range of motion deficits identified  -CM     Row Name 01/05/21 1202          Strength Comprehensive (MMT)    General Manual Muscle Testing (MMT) Assessment  no strength deficits identified  -CM     Row Name 01/05/21 1202          Motor Skills    Motor Skills   coordination  -CM     Coordination  gross motor deficit;minimal impairment  -CM     Row Name 01/05/21 1202          Balance    Balance Assessment  sitting static balance;standing static balance;standing dynamic balance;sitting dynamic balance  -CM     Static Sitting Balance  WNL;sitting in chair  -CM     Dynamic Sitting Balance  sitting in chair;WFL  -CM     Static Standing Balance  mild impairment;supported;standing rw  -CM     Dynamic Standing Balance  standing;supported;mild impairment rw  -CM     Row Name 01/05/21 1202          Sensory Assessment (Somatosensory)    Sensory Assessment (Somatosensory)  sensation intact  -CM       User Key  (r) = Recorded By, (t) = Taken By, (c) = Cosigned By    Initials Name Provider Type    Wendi Parks, PT Physical Therapist        Goals/Plan     Row Name 01/05/21 1207          Bed Mobility Goal 1 (PT)    Activity/Assistive Device (Bed Mobility Goal 1, PT)  bed mobility activities, all  -CM     [place] Level/Cues Needed (Bed Mobility Goal 1, PT)  independent  -CM     Time Frame (Bed Mobility Goal 1, PT)  2 weeks  -CM     Row Name 01/05/21 1207          Transfer Goal 1 (PT)    Activity/Assistive Device (Transfer Goal 1, PT)  transfers, all;walker, rolling  -CM     [place] Level/Cues Needed (Transfer Goal 1, PT)  supervision required  -CM     Time Frame (Transfer Goal 1, PT)  2 weeks  -CM     Row Name 01/05/21 1207          Gait Training Goal 1 (PT)    Activity/Assistive Device (Gait Training Goal 1, PT)  gait (walking locomotion);walker, rolling  -CM     [place] Level (Gait Training Goal 1, PT)  supervision required  -CM     Distance (Gait Training Goal 1, PT)  300 ft w/ rw, no loss of balance  -CM     Time Frame (Gait Training Goal 1, PT)  2 weeks  -CM       User Key  (r) = Recorded By, (t) = Taken By, (c) = Cosigned By    Initials Name Provider Type    Wendi Parks, PT Physical Therapist        Clinical Impression     Row Name 01/05/21 1202           Pain    Additional Documentation  Pain Scale: Numbers Pre/Post-Treatment (Group)  -CM     Row Name 01/05/21 1202          Pain Scale: Numbers Pre/Post-Treatment    Pretreatment Pain Rating  0/10 - no pain  -CM     Posttreatment Pain Rating  0/10 - no pain  -CM     Row Name 01/05/21 1248 01/05/21 1203       Plan of Care Review    Plan of Care Reviewed With  --  patient  -CM    Outcome Summary   spoke to family and learned that pt actually lives in assisted living. Pt not safe to return to assisted living at this time due to unsteady gait and high falls risk.  Will require IP rehab at d/c.  -CM  81 yo male adm for possible TIA. 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 Pt was reportedly having some dysarthria at time of admission, but this has now resolved. Pt resides at [facility]. He is able to come to stand w/ min assist, as he demonstrates posterior lean w/ coming to stand. He ambulates 50 ft w/ rw and cga, again due to intermittent posterior lean. Recommend return to [facility] when medically stable. Will follow 3xwk to work toward increased safety of ambulation, as pt remains markedly impulsive. PPE: gloves, mask, goggles.  -CM    Row Name 01/05/21 1203          Therapy Assessment/Plan (PT)    Patient/Family Therapy Goals Statement (PT)  pt agreeable to PT  -CM     Rehab Potential (PT)  good, to achieve stated therapy goals  -CM     Criteria for Skilled Interventions Met (PT)  yes;meets criteria;skilled treatment is necessary  -CM     Predicted Duration of Therapy Intervention (PT)  until d/c  -     Row Name 01/05/21 1203          Positioning and Restraints    Pre-Treatment Position  sitting in chair/recliner  -CM     Post Treatment Position  chair  -CM     In Chair  notified nsg;sitting;call light within reach;encouraged to call for assist;exit alarm on  -CM       User Key  (r) = Recorded By, (t) = Taken By, (c) = Cosigned By    Initials Name Provider Type    Wendi Parks  PT Physical Therapist        Outcome Measures     Row Name 01/05/21 1208          Modified [place] Scale    Pre-Stroke Modified Oralia Scale  3 - Moderate disability.  Requiring some help, but able to walk without assistance.  -CM     Modified Oralia Scale  4 - Moderately severe disability.  Unable to walk without assistance, and unable to attend to own bodily needs without assistance.  -CM     Row Name 01/05/21 1208          Functional Assessment    Outcome Measure Options  Modified Oralia  -       User Key  (r) = Recorded By, (t) = Taken By, (c) = Cosigned By    Initials Name Provider Type    Wendi Parks, PT Physical Therapist        Physical Therapy Education                 Title: PT OT SLP Therapies (Done)     Topic: Physical Therapy (Done)     Point: Mobility training (Done)     Learning Progress Summary           Patient Acceptance, E,TB, VU,NR by GAVINO at 1/5/2021 1208                               User Key     Initials Effective Dates Name Provider Type Discipline     03/01/19 -  Wendi Mcmillan, PT Physical Therapist PT              PT Recommendation and Plan  Planned Therapy Interventions (PT): balance training, bed mobility training, gait training, patient/family education, motor coordination training, neuromuscular re-education, transfer training, postural re-education, strengthening  Plan of Care Reviewed With: patient  Outcome Summary:  spoke to family and learned that pt actually lives in assisted living. Pt not safe to return to assisted living at this time due to unsteady gait and high falls risk.  Will require IP rehab at d/c.     Time Calculation:   PT Charges     Row Name 01/05/21 1209             Time Calculation    Start Time  0944  -CM      Stop Time  1002  -CM      Time Calculation (min)  18 min  -CM      PT Received On  01/05/21  -CM      PT - Next Appointment  01/07/21  -CM      PT Goal Re-Cert Due Date  01/19/21  -CM         Time Calculation- PT    Total Timed Code  Minutes- PT  0 minute(s)  -GAVINO        User Key  (r) = Recorded By, (t) = Taken By, (c) = Cosigned By    Initials Name Provider Type    Wendi Parks, PT Physical Therapist        Therapy Charges for Today     Code Description Service Date Service Provider Modifiers Qty    [id number]  PT EVAL LOW COMPLEXITY 3 1/5/2021 Wendi Mcmillan, PT GP 1          PT G-Codes  Outcome Measure Options: Modified Orange  Modified Orange Scale: 4 - Moderately severe disability.  Unable to walk without assistance, and unable to attend to own bodily needs without assistance.    Wendi Mcmillan, PT  1/5/2021

## 2021-01-05 NOTE — PLAN OF CARE
Goal Outcome Evaluation:  Plan of Care Reviewed With: patient      Patient went down for MRI. He refused Echo. Patient wants to go back home. Valerie SWANN with neurology stated ok to d/c back to [facility] to follow up with IR Neurology in 3 weeks.  Discharge orders and instructions will be given to patient upon discharge. Dustin is making arrangements for transport for him.

## 2021-01-05 NOTE — DISCHARGE INSTR - LAB
Follow up with his PCP to see if they want to send him to a Neuro-Interventionalist for internal carotid occlusion.

## 2021-01-05 NOTE — H&P
"      [facility] Medicine Services      Patient Name: Lucio Valladares  : 1940  MRN: [id number]  Primary Care Physician: Asmita, No Known  Date of admission: 2021    Patient Care Team:  Provider, No Known as PCP - General          Subjective   History Present Illness     Chief Complaint:   Chief Complaint   Patient presents with   • Speech Problem     slurred speech started today at 1600, pt resides at  [facility].          Mr. Valladares is a 80 y.o. male with an uncertain past medical history who presents to [facility] after family noticed what they felt was a change in his speech.  Patient is alert and oriented x2 and appears somewhat agitated that he is at the hospital.  He states that his daughter is his POA although he did not want her to be and she is trying to interfere with his life including preventing him from traveling because she \"thinks I have dementia\".  At the time my exam patient speech does appear mildly slurred though he states that it is at baseline and denies any complaint including focal weakness, recent falls, headache or any other pain, dyspnea, nausea vomiting, change in bowel or bladder habits, sensation of tachycardia or palpitations, syncope or near syncope.  He notes that he had a previous CVA approximately 2 to 3 years ago and uses some mobility aids at home including a cane and a walker but is otherwise able to live independently with his daughter checking on him periodically.  He cannot provide any further information regarding his medical history or medications he takes regularly other than to say that [facility]    In the ED patient had lab significant for creatinine: 1.22, BUN: 24, BUN/creatinine ratio: 19.7, glucose: 151 remainder of CBC and CMP was generally unremarkable.  INR: 1.08, PT: 11.8, PTT: 30.0.  Chest x-ray shows no acute cardiopulmonary abnormality.  CT of head without contrast shows " no acute intracranial abnormality with a remote infarct at the right frontal lobe noted as well as mild age-appropriate volume loss with a moderate amount of low-density in the periventricular subcortical white matter.  Radiologist notes this is nonspecific but most commonly with a result of small vessel ischemic changes.  CTA of head and neck is pending.  EKG shows sinus rhythm at 67 without obvious acute ST changes or ectopy and a QTC of 411 ms.    History of Present Illness    Review of Systems   Unable to perform ROS: mental status change           Personal History     Past Medical History: History reviewed. No pertinent past medical history.    Surgical History:    History reviewed. No pertinent surgical history.        Family History: family history is not on file. Otherwise pertinent FHx was reviewed and unremarkable.     Social History:        Medications:  Prior to Admission medications    Not on File       Allergies:  No Known Allergies    Objective   Objective     Vital Signs  Temp:  [98.9 °F (37.2 °C)] 98.9 °F (37.2 °C)  Heart Rate:  [67-72] 67  Resp:  [18-22] 18  BP: (118-129)/(70-74) 118/70  SpO2:  [92 %-98 %] 98 %  on   ;      Body mass index is 28.31 kg/m².    Physical Exam  Vitals signs reviewed.   Constitutional:       General: He is not in acute distress.     Appearance: Normal appearance. He is normal weight. He is not ill-appearing or toxic-appearing.   HENT:      Head: Normocephalic and atraumatic.      Right Ear: External ear normal.      Left Ear: External ear normal.      Nose: Nose normal.      Mouth/Throat:      Mouth: Mucous membranes are moist.   Eyes:      Extraocular Movements: Extraocular movements intact.      Pupils: Pupils are equal, round, and reactive to light.   Neck:      Musculoskeletal: Normal range of motion.   Cardiovascular:      Rate and Rhythm: Normal rate and regular rhythm.      Pulses: Normal pulses.      Heart sounds: Normal heart sounds.   Pulmonary:      Effort:  Pulmonary effort is normal.      Breath sounds: Normal breath sounds.   Abdominal:      General: Bowel sounds are normal. There is no distension.      Tenderness: There is no abdominal tenderness.   Musculoskeletal: Normal range of motion.   Skin:     General: Skin is warm and dry.      Capillary Refill: Capillary refill takes less than 2 seconds.   Neurological:      General: No focal deficit present.      Mental Status: He is alert.      Comments: Alert and oriented to person and date but believes he is currently in [facility]   Psychiatric:         Behavior: Behavior normal.         Thought Content: Thought content normal.         Judgment: Judgment normal.         Results Review:  I have personally reviewed most recent cardiac tracings, lab results and radiology images and interpretations and agree with findings, most notably: Troponin, CBC, CMP, PT/INR, PTT, chest x-ray, CT of head and EKG.    Results from last 7 days   Lab Units 01/04/21  1802   WBC 10*3/mm3 8.20   HEMOGLOBIN g/dL 15.1   HEMATOCRIT % 44.7   PLATELETS 10*3/mm3 268   INR  1.08     Results from last 7 days   Lab Units 01/04/21  1802   SODIUM mmol/L 137   POTASSIUM mmol/L 4.6   CHLORIDE mmol/L 105   CO2 mmol/L 19.0*   BUN mg/dL 24*   CREATININE mg/dL 1.22   GLUCOSE mg/dL 151*   CALCIUM mg/dL 9.9   ALT (SGPT) U/L 12   AST (SGOT) U/L 17   TROPONIN T ng/mL <0.010     Estimated Creatinine Clearance: 55.1 mL/min (by C-G formula based on SCr of 1.22 mg/dL).  Brief Urine Lab Results     None          Microbiology Results (last 10 days)     ** No results found for the last 240 hours. **          ECG/EMG Results (most recent)     Procedure Component Value Units Date/Time    ECG 12 Lead [[id number]] Collected: 01/04/21 1835     Updated: 01/04/21 1837     QT Interval 396 ms     Narrative:      HEART RATE= 67  bpm  RR Interval= 888  ms  CO Interval= 189  ms  P Horizontal Axis= -46  deg  P Front Axis= 58  deg  QRSD Interval= 108  ms  QT Interval= 396   ms  QRS Axis= -20  deg  T Wave Axis= 86  deg  - BORDERLINE ECG -  Sinus rhythm  Low voltage, extremity leads  Probable lateral infarct, old  Electronically Signed By:   Date and Time of Study: 2021-01-04 18:35:09                    Xr Chest 1 View    Result Date: 1/4/2021  No acute cardiopulmonary abnormality.  Electronically Signed By-Misti Spain MD On:1/4/2021 6:46 PM This report was finalized on [id number] by  Misti Spain MD.    Ct Head Without Contrast Stroke Protocol    Result Date: 1/4/2021  No acute intracranial abnormality.. There is remote infarct at the right frontal lobe. There is mild age-appropriate volume loss and there is a moderate amount of low-density in the periventricular subcortical white matter. This low density is nonspecific, but most commonly seen with chronic small vessel ischemic change. Brain MRI is more sensitive to evaluate for acute or subacute infarcts and to evaluate for intracranial metastatic disease.  Electronically Signed By-Misti Spain MD On:1/4/2021 6:08 PM This report was finalized on [id number] by  Misti Spain MD.        Estimated Creatinine Clearance: 55.1 mL/min (by C-G formula based on SCr of 1.22 mg/dL).    Assessment/Plan   Assessment/Plan       Active Hospital Problems    Diagnosis  POA   • Speech disturbance [R47.9]  Yes     Priority: High      Resolved Hospital Problems   No resolved problems to display.     Speech disturbance  -Patient presents with  -  CT of head without contrast shows no acute intracranial abnormality with a remote infarct at the right frontal lobe noted as well as mild age-appropriate volume loss with a moderate amount of low-density in the periventricular subcortical white matter.  Radiologist notes this is nonspecific but most commonly with a result of small vessel ischemic changes.  -CTA of head and neck pending  - EKG: Sinus rhythm at 67 without obvious acute ST changes or ectopy and a QTC of 411 ms  -Check TSH, B12, Lipid panael and  A1C  - Neuro checks q 4  -MRI Brain  -Echocardiogram ordered  -N.p.o. pending swallow Eval  -Start 81 mg aspirin and Lipitor 80 mg until further evaluation of home meds in a.m. is complete  - PT/OT Eval  -Neurology consulted    Patient and family uncertain of patient's other medical history and any medications he may take, [facility] however they are unavailable for consult at this time.          VTE Prophylaxis -SCDs  Mechanical Order History:     None      Pharmalogical Order History:     None          CODE STATUS: Full  There are no questions and answers to display.         I discussed the patient's findings and my recommendations with patient and nursing staff.      Signature:Electronically signed by Aaron Ruiz PA-C, 01/05/21, 3:00 AM EST.    [facility]ist Team

## 2021-01-05 NOTE — CONSULTS
Diabetes Education    Patient Name:  Lucio Valladares  YOB: 1940  MRN: [id number]  Admit Date:  1/4/2021    Received consult due to stroke protocol being ordered. A1c today was 6.5%. Contacted daughter per phone call. She states pt with early stages of dementia. Pt lives at independent living facility, [facility]. She states they dispense pt's medications but he is the one responsible for giving his medication. Per home med list, pt taking Lantus 18 units hs and metformin 500 mg bid. Pt does have bs meter but daughter not sure if pt checking bs. Discussed importance of having bs checked daily with pt taking insulin. Also discussed importance of ensuring pt is taking his medications properly. [facility]. Daughter states pt is needing more assistance, and she will be checking into other facilities where nursing staff will administer the medication and check pt's bs. Discussed importance of bs control. Reviewed A1c result with daughter and discussed healthy bs range and healthy A1c target. Daughter verbalized understanding of info and states she does not have further questions at this time.      Electronically signed by:  Blaire Rojas RN  01/05/21 16:59 EST

## 2021-01-05 NOTE — THERAPY EVALUATION
Patient Name: Lucio Valladares  : 1940    MRN: [id number]                              Today's Date: 2021       Admit Date: 2021    Visit Dx:     ICD-10-CM ICD-9-CM   1. TIA (transient ischemic attack)  G45.9 435.9     Patient Active Problem List   Diagnosis   • Speech disturbance   • TIA (transient ischemic attack)     History reviewed. No pertinent past medical history.  History reviewed. No pertinent surgical history.  General Information     Row Name 21 1156          Physical Therapy Time and Intention    Document Type  evaluation  -CM     Mode of Treatment  physical therapy  -     Row Name 21 1156          General Information    Patient Profile Reviewed  yes  -CM     Prior Level of Function  gait uses rw for mobility at [facility]; level of assist unknown  -CM     Existing Precautions/Restrictions  fall  -CM     Barriers to Rehab  cognitive status  -CM     Row Name 21 1156          Living Environment    Lives With  facility resident  -     Row Name 21 1156          Home Main Entrance    Number of Stairs, Main Entrance  none  -CM     Row Name 21 1156          Stairs Within Home, Primary    Number of Stairs, Within Home, Primary  none  -CM     Row Name 21 1156          Cognition    Orientation Status (Cognition)  disoriented to;person;place;situation;time very pleasantly confused; talks about his  wife, Troy; states she comes to see him sometimes  -CM     Row Name 21 1200 21 1156       Safety Issues, Functional Mobility    Safety Issues Affecting Function (Mobility)  --  impulsivity;insight into deficits/self-awareness;positioning of assistive device;safety precaution awareness;safety precautions follow-through/compliance;judgment;problem-solving;awareness of need for assistance;at risk behavior observed  -CM    Impairments Affecting Function (Mobility)  balance;cognition  -CM  balance;cognition;postural/trunk control  -CM      User Key  (r) =  Recorded By, (t) = Taken By, (c) = Cosigned By    Initials Name Provider Type    Wendi Parks PT Physical Therapist        Mobility     Row Name 01/05/21 1200          Bed Mobility    Bed Mobility  bed mobility (all) activities;supine-sit;sit-supine  -CM     Comment (Bed Mobility)  already in chair when PT arrived; RN reports pt stood and amb to door of room earlier w/o assistance but was very unsteady  -CM     Row Name 01/05/21 1200          Transfers    Comment (Transfers)  needs cuing for proper hand position and wt shifting anteriorly w/ coming to stand; has posterior loss of balance upon coming to stand.  -CM     Row Name 01/05/21 1200          Bed-Chair Transfer    Bed-Chair [place] (Transfers)  not tested  -CM     Row Name 01/05/21 1200          Sit-Stand Transfer    Sit-Stand [place] (Transfers)  minimum assist (75% patient effort);verbal cues;nonverbal cues (demo/gesture)  -CM     Assistive Device (Sit-Stand Transfers)  walker, front-wheeled  -CM     Row Name 01/05/21 1203 01/05/21 1200       Gait/Stairs (Locomotion)    [place] Level (Gait)  --  contact guard;1 person assist  -CM    Assistive Device (Gait)  --  walker, front-wheeled  -CM    Distance in Feet (Gait)  50 ft; mild posterior lean w/ loss of balance intermittently  -CM  40 ft; mild posterior lean w/ loss of balance intermittently  -CM    Deviations/Abnormal Patterns (Gait)  --  bilateral deviations  -CM      User Key  (r) = Recorded By, (t) = Taken By, (c) = Cosigned By    Initials Name Provider Type    Wendi Parks PT Physical Therapist        Obj/Interventions     Row Name 01/05/21 1202          Range of Motion Comprehensive    General Range of Motion  no range of motion deficits identified  -CM     Row Name 01/05/21 1202          Strength Comprehensive (MMT)    General Manual Muscle Testing (MMT) Assessment  no strength deficits identified  -CM     Row Name 01/05/21 1202          Motor Skills    Motor Skills   coordination  -CM     Coordination  gross motor deficit;minimal impairment  -CM     Row Name 01/05/21 1202          Balance    Balance Assessment  sitting static balance;standing static balance;standing dynamic balance;sitting dynamic balance  -CM     Static Sitting Balance  WNL;sitting in chair  -CM     Dynamic Sitting Balance  sitting in chair;WFL  -CM     Static Standing Balance  mild impairment;supported;standing rw  -CM     Dynamic Standing Balance  standing;supported;mild impairment rw  -CM     Row Name 01/05/21 1202          Sensory Assessment (Somatosensory)    Sensory Assessment (Somatosensory)  sensation intact  -CM       User Key  (r) = Recorded By, (t) = Taken By, (c) = Cosigned By    Initials Name Provider Type    Wendi Parks, PT Physical Therapist        Goals/Plan     Row Name 01/05/21 1207          Bed Mobility Goal 1 (PT)    Activity/Assistive Device (Bed Mobility Goal 1, PT)  bed mobility activities, all  -CM     [place] Level/Cues Needed (Bed Mobility Goal 1, PT)  independent  -CM     Time Frame (Bed Mobility Goal 1, PT)  2 weeks  -CM     Row Name 01/05/21 1207          Transfer Goal 1 (PT)    Activity/Assistive Device (Transfer Goal 1, PT)  transfers, all;walker, rolling  -CM     [place] Level/Cues Needed (Transfer Goal 1, PT)  supervision required  -CM     Time Frame (Transfer Goal 1, PT)  2 weeks  -CM     Row Name 01/05/21 1207          Gait Training Goal 1 (PT)    Activity/Assistive Device (Gait Training Goal 1, PT)  gait (walking locomotion);walker, rolling  -CM     [place] Level (Gait Training Goal 1, PT)  supervision required  -CM     Distance (Gait Training Goal 1, PT)  300 ft w/ rw, no loss of balance  -CM     Time Frame (Gait Training Goal 1, PT)  2 weeks  -CM       User Key  (r) = Recorded By, (t) = Taken By, (c) = Cosigned By    Initials Name Provider Type    Wendi Parks, PT Physical Therapist        Clinical Impression     Row Name 01/05/21 1202           Pain    Additional Documentation  Pain Scale: Numbers Pre/Post-Treatment (Group)  -CM     Row Name 01/05/21 1202          Pain Scale: Numbers Pre/Post-Treatment    Pretreatment Pain Rating  0/10 - no pain  -CM     Posttreatment Pain Rating  0/10 - no pain  -CM     Row Name 01/05/21 1203          Plan of Care Review    Plan of Care Reviewed With  patient  -CM     Outcome Summary  81 yo male adm for possible TIA. 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 Pt was reportedly having some dysarthria at time of admission, but this has now resolved. Pt resides at [facility]. He is able to come to stand w/ min assist, as he demonstrates posterior lean w/ coming to stand. He ambulates 50 ft w/ rw and cga, again due to intermittent posterior lean. Recommend return to [facility] when medically stable. Will follow 3xwk to work toward increased safety of ambulation, as pt remains markedly impulsive. PPE: gloves, mask, goggles.  -CM     Row Name 01/05/21 1203          Therapy Assessment/Plan (PT)    Patient/Family Therapy Goals Statement (PT)  pt agreeable to PT  -CM     Rehab Potential (PT)  good, to achieve stated therapy goals  -CM     Criteria for Skilled Interventions Met (PT)  yes;meets criteria;skilled treatment is necessary  -CM     Predicted Duration of Therapy Intervention (PT)  until d/c  -CM     Row Name 01/05/21 1203          Positioning and Restraints    Pre-Treatment Position  sitting in chair/recliner  -CM     Post Treatment Position  chair  -CM     In Chair  notified nsg;sitting;call light within reach;encouraged to call for assist;exit alarm on  -CM       User Key  (r) = Recorded By, (t) = Taken By, (c) = Cosigned By    Initials Name Provider Type    Wendi Parks, PT Physical Therapist        Outcome Measures     Row Name 01/05/21 1208          Modified [place] Scale    Pre-Stroke Modified [place] Scale  3 - Moderate disability.  Requiring some help, but able to walk without assistance.  -CM      Modified [place] Scale  4 - Moderately severe disability.  Unable to walk without assistance, and unable to attend to own bodily needs without assistance.  -     Row Name 01/05/21 1208          Functional Assessment    Outcome Measure Options  Modified [place]  -       User Key  (r) = Recorded By, (t) = Taken By, (c) = Cosigned By    Initials Name Provider Type    CM Wendi Mcmillan, PT Physical Therapist        Physical Therapy Education                 Title: PT OT SLP Therapies (Done)     Topic: Physical Therapy (Done)     Point: Mobility training (Done)     Learning Progress Summary           Patient Acceptance, E,TB, VU,NR by  at 1/5/2021 1208                               User Key     Initials Effective Dates Name Provider Type Discipline     03/01/19 -  Wendi Mcmillan, PT Physical Therapist PT              PT Recommendation and Plan  Planned Therapy Interventions (PT): balance training, bed mobility training, gait training, patient/family education, motor coordination training, neuromuscular re-education, transfer training, postural re-education, strengthening  Plan of Care Reviewed With: patient  Outcome Summary: 79 yo male adm for possible TIA. 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 Pt was reportedly having some dysarthria at time of admission, but this has now resolved. [facility] at [facility]. He is able to come to stand w/ min assist, as he demonstrates posterior lean w/ coming to stand. He ambulates 50 ft w/ rw and cga, again due to intermittent posterior lean. Recommend return to [facility] when medically stable. Will follow 3xwk to work toward increased safety of ambulation, as pt remains markedly impulsive. PPE: gloves, mask, goggles.     Time Calculation:   PT Charges     Row Name 01/05/21 1206             Time Calculation    Start Time  0944  -CM      Stop Time  1002  -CM      Time Calculation (min)  18 min  -CM      PT Received On  01/05/21  -CM      PT - Next  Appointment  01/07/21  -CM      PT Goal Re-Cert Due Date  01/19/21  -CM         Time Calculation- PT    Total Timed Code Minutes- PT  0 minute(s)  -CM        User Key  (r) = Recorded By, (t) = Taken By, (c) = Cosigned By    Initials Name Provider Type    Wendi Parks, PT Physical Therapist        Therapy Charges for Today     Code Description Service Date Service Provider Modifiers Qty    [id number] HC PT EVAL LOW COMPLEXITY 3 1/5/2021 Wendi Mcmillan, PT GP 1          PT G-Codes  Outcome Measure Options: Modified [place]  Modified [place] Scale: 4 - Moderately severe disability.  Unable to walk without assistance, and unable to attend to own bodily needs without assistance.    Wendi Mcmillan, PT  1/5/2021

## 2021-01-05 NOTE — CONSULTS
"    Primary Care Provider: Provider, No Known     Consult requested by: Dr. Treviño    Reason for Consultation: Neurological evaluation    History taken from: patient chart RN    Chief complaint: slurred speech       SUBJECTIVE:    History of present illness: Background per H&P: Mr. Valladares is a 80 y.o. male with an uncertain past medical history, reported CVA, who presented to [facility] on 1/4 after family noticed what they felt was a change in his speech.  Patient was alert and oriented x2 and somewhat agitated that he was brought to the hospital.  He stated that his daughter is his POA although he did not want her to be and she is trying to interfere with his life including preventing him from traveling because she \"thinks I have dementia\". Pt noted to have mild slurred speech in the ED, but pt felt that was baseline for him. Pt denied any complaint including focal weakness, recent falls, headache or any other pain, dyspnea, nausea vomiting, change in bowel or bladder habits, sensation of tachycardia or palpitations, syncope or near syncope.  He notes that he had a previous CVA approximately 2 to 3 years ago and uses some mobility aids at home including a cane and a walker, but is otherwise able to live independently with his daughter checking on him periodically.  He cannot provide any further information regarding his medical history or medications he takes regularly other than to say [facility]  - Portions of the above HPI were copied from previous encounters and edited as appropriate.    Pt states that his speech is close to baseline. He thinks it may be more difficult to understand him because he is not wearing his dentures.     Review of Systems   Constitutional: Negative for chills, diaphoresis and fatigue.   Eyes: Negative for photophobia and visual disturbance.   Neurological: Positive for speech difficulty. Negative for dizziness, tremors, seizures, syncope, " facial asymmetry, weakness, light-headedness, numbness and headaches.          PATIENT HISTORY:  History reviewed. No pertinent past medical history., History reviewed. No pertinent surgical history., History reviewed. No pertinent family history.,   Social History     Tobacco Use   • Smoking status: Never Smoker   Substance Use Topics   • Alcohol use: Not on file   • Drug use: Not on file   ,   Medications Prior to Admission   Medication Sig Dispense Refill Last Dose   • acetaminophen (TYLENOL) 325 MG tablet Take 650 mg by mouth Every 6 (Six) Hours As Needed for Mild Pain  or Fever.      • albuterol sulfate  (90 Base) MCG/ACT inhaler Inhale 2 puffs Every 6 (Six) Hours As Needed for Wheezing or Shortness of Air.      • apixaban (ELIQUIS) 5 MG tablet tablet Take 5 mg by mouth 2 (Two) Times a Day.      • atorvastatin (LIPITOR) 40 MG tablet Take 40 mg by mouth Daily.      • carboxymethylcellulose (REFRESH PLUS) 0.5 % solution Administer 1 drop to both eyes 4 (Four) Times a Day As Needed for Dry Eyes.      • cholecalciferol (VITAMIN D3) 25 MCG (1000 UT) tablet Take 1,000 Units by mouth Daily.      • Diclofenac Sodium (VOLTAREN) 1 % gel gel Apply 4 g topically to the appropriate area as directed 4 (Four) Times a Day As Needed (knee pain).      • ferrous gluconate 324 (37.5 Fe) MG tablet tablet Take 324 mg by mouth Every Other Day.      • finasteride (PROSCAR) 5 MG tablet Take 5 mg by mouth Daily.      • gabapentin (NEURONTIN) 100 MG capsule Take 200 mg by mouth 2 (two) times a day.      • insulin glargine (LANTUS, SEMGLEE) 100 UNIT/ML injection Inject 18 Units under the skin into the appropriate area as directed Every Night.      • metFORMIN (GLUCOPHAGE) 500 MG tablet Take 500 mg by mouth 2 (Two) Times a Day With Meals.      • metoprolol succinate XL (TOPROL-XL) 25 MG 24 hr tablet Take 12.5 mg by mouth Daily.      • polyethylene glycol (MIRALAX) 17 g packet Take 17 g by mouth Daily As Needed (constipation).      •  sennosides-docusate (senna-docusate sodium) 8.6-50 MG per tablet Take 1 tablet by mouth At Night As Needed for Constipation.      • sodium chloride 0.65 % nasal spray 1 spray into the nostril(s) as directed by provider Every 6 (Six) Hours As Needed for Congestion.      • tamsulosin (FLOMAX) 0.4 MG capsule 24 hr capsule Take 2 capsules by mouth Every Night.      • tiotropium bromide-olodaterol (STIOLTO RESPIMAT) 2.5-2.5 MCG/ACT aerosol solution inhaler Inhale 2 puffs Daily.      , Scheduled Meds:  aspirin, 81 mg, Oral, Daily    Or  aspirin, 300 mg, Rectal, Daily  atorvastatin, 80 mg, Oral, Nightly  sodium chloride, 10 mL, Intravenous, Q12H    , Continuous Infusions:  sodium chloride, 125 mL/hr, Last Rate: 125 mL/hr (01/05/21 0006)    , PRN Meds:  •  aluminum-magnesium hydroxide-simethicone  •  Calcium Gluconate-NaCl **AND** calcium gluconate **AND** Calcium, Ionized  •  docusate sodium  •  magnesium sulfate **OR** magnesium sulfate **OR** magnesium sulfate  •  ondansetron  •  potassium & sodium phosphates **OR** potassium & sodium phosphates  •  potassium chloride  •  potassium chloride  •  sodium chloride  •  sodium chloride, Allergies:  Patient has no known allergies.    ________________________________________________________        OBJECTIVE:  Upon today's exam, pt is awake and alert. Sitting at BS, getting dressed and putting shoes on.       Neurologic Exam  PHYSICAL EXAM:    CONSTITUTIONAL: The patient is in no apparent distress, bright awake and alert. There is no shortness of breath.     HEENT: There is no tenderness over the temporal arteries bilaterally. Normocephalic, atraumatic. TMJ open symmetrically without tenderness.    NECK: ROM normal, supple    RESPIRATORY: Breathing unlabored, Breath sounds are normal    CARDIAC: Regular rate and rhythm.     EXTREMITIES:  No clubbing, cyanosis or edema.    PSYCHIATRIC: Mood/affect normal    NEUROLOGICAL:    Cognition:   Oriented to person, place, month, president.  States it is 2001.   Fund of knowledge fair  Concentration and attention normal.   Language:h normal comprehension, fluent speech, intact repetition and naming. Dysarthria/dysphonia, possible related to not wearing dentures. No aphasia  Very poor historian, memory limited    Cranial nerves;    II - pupils bilaterally equal reacting to light,  No new Visual field deficits;  Fundoscopic exam- Not able to be done, non-dilated exam  III,IV,VI: EOMI with no diplopia  V: Normal facial sensations  VII: No facial asymmetry,  VIII: No New hearing abnormality  IX, X, XI: normal gag and shoulder shrug;  XII: tongue is in the midline.    Sensory:  Intact to light touch in all extremities.     Motor: Strength 5/5 bilaterally upper and lower extremities. No involuntary movements present. Normal tone and bulk.  Deep tendon reflexes: 2/4 and symmetrical in biceps, brachioradialis, triceps, bilateral 2/4 knees and ankles. Both plantars are flexor.    Cerebellar: Finger to nose and mirror movements normal bilaterally.    Gait and balance: deferred    ________________________________________________________   RESULTS REVIEW:    VITAL SIGNS:   Temp:  [97.3 °F (36.3 °C)-98.9 °F (37.2 °C)] 98.9 °F (37.2 °C)  Heart Rate:  [] 91  Resp:  [13-22] 14  BP: (105-154)/(59-87) 127/70     LABS:  WBC   Date Value Ref Range Status   01/05/2021 8.00 3.40 - 10.80 10*3/mm3 Final     RBC   Date Value Ref Range Status   01/05/2021 4.63 4.14 - 5.80 10*6/mm3 Final     Hemoglobin   Date Value Ref Range Status   01/05/2021 14.2 13.0 - 17.7 g/dL Final     Hematocrit   Date Value Ref Range Status   01/05/2021 42.5 37.5 - 51.0 % Final     MCV   Date Value Ref Range Status   01/05/2021 91.7 79.0 - 97.0 fL Final     MCH   Date Value Ref Range Status   01/05/2021 30.6 26.6 - 33.0 pg Final     MCHC   Date Value Ref Range Status   01/05/2021 33.4 31.5 - 35.7 g/dL Final     RDW   Date Value Ref Range Status   01/05/2021 15.3 12.3 - 15.4 % Final     RDW-SD    Date Value Ref Range Status   01/05/2021 49.0 37.0 - 54.0 fl Final     MPV   Date Value Ref Range Status   01/05/2021 8.9 6.0 - 12.0 fL Final     Platelets   Date Value Ref Range Status   01/05/2021 231 140 - 450 10*3/mm3 Final     Neutrophil %   Date Value Ref Range Status   01/04/2021 62.6 42.7 - 76.0 % Final     Lymphocyte %   Date Value Ref Range Status   01/04/2021 28.2 19.6 - 45.3 % Final     Monocyte %   Date Value Ref Range Status   01/04/2021 7.4 5.0 - 12.0 % Final     Eosinophil %   Date Value Ref Range Status   01/04/2021 0.6 0.3 - 6.2 % Final     Basophil %   Date Value Ref Range Status   01/04/2021 1.2 0.0 - 1.5 % Final     Neutrophils, Absolute   Date Value Ref Range Status   01/04/2021 5.10 1.70 - 7.00 10*3/mm3 Final     Lymphocytes, Absolute   Date Value Ref Range Status   01/04/2021 2.30 0.70 - 3.10 10*3/mm3 Final     Monocytes, Absolute   Date Value Ref Range Status   01/04/2021 0.60 0.10 - 0.90 10*3/mm3 Final     Eosinophils, Absolute   Date Value Ref Range Status   01/04/2021 0.10 0.00 - 0.40 10*3/mm3 Final     Basophils, Absolute   Date Value Ref Range Status   01/04/2021 0.10 0.00 - 0.20 10*3/mm3 Final     nRBC   Date Value Ref Range Status   01/04/2021 0.1 0.0 - 0.2 /100 WBC Final     Glucose   Date Value Ref Range Status   01/05/2021 216 (H) 65 - 99 mg/dL Final     BUN   Date Value Ref Range Status   01/05/2021 23 8 - 23 mg/dL Final     Creatinine   Date Value Ref Range Status   01/05/2021 1.26 0.76 - 1.27 mg/dL Final     Sodium   Date Value Ref Range Status   01/05/2021 136 136 - 145 mmol/L Final     Potassium   Date Value Ref Range Status   01/05/2021 3.5 3.5 - 5.2 mmol/L Final     Chloride   Date Value Ref Range Status   01/05/2021 104 98 - 107 mmol/L Final     CO2   Date Value Ref Range Status   01/05/2021 21.0 (L) 22.0 - 29.0 mmol/L Final     Calcium   Date Value Ref Range Status   01/05/2021 9.4 8.6 - 10.5 mg/dL Final     Total Protein   Date Value Ref Range Status   01/05/2021 6.7  6.0 - 8.5 g/dL Final     Albumin   Date Value Ref Range Status   01/05/2021 4.00 3.50 - 5.20 g/dL Final     ALT (SGPT)   Date Value Ref Range Status   01/05/2021 8 1 - 41 U/L Final     AST (SGOT)   Date Value Ref Range Status   01/05/2021 21 1 - 40 U/L Final     Alkaline Phosphatase   Date Value Ref Range Status   01/05/2021 98 39 - 117 U/L Final     Total Bilirubin   Date Value Ref Range Status   01/05/2021 0.9 0.0 - 1.2 mg/dL Final     eGFR Non  Amer   Date Value Ref Range Status   01/05/2021 55 (L) >60 mL/min/1.73 Final     BUN/Creatinine Ratio   Date Value Ref Range Status   01/05/2021 18.3 7.0 - 25.0 Final     Anion Gap   Date Value Ref Range Status   01/05/2021 11.0 5.0 - 15.0 mmol/L Final       Lab Results   Component Value Date    TSH 3.620 01/05/2021    LDL 30 01/05/2021         IMAGING STUDIES:  Ct Angiogram Neck    Result Date: 1/5/2021   1. No obvious acute large vessel cerebral arterial occlusions appreciated. 2. Complete occlusion, left internal carotid artery, age indeterminate. 3. Absence versus occlusion, terminal most left vertebral artery. The right vertebral artery is widely patent and dominant in caliber. The basilar artery is also widely patent. 4. Moderate grade stenosis, prevertebral proximal left subclavian artery. 5. Additional findings, both vascular and nonvascular, as described above detail.  Electronically Signed By-Heath Allan MD On:1/5/2021 7:53 AM This report was finalized on [id number] by  Heath Allan MD.    Xr Chest 1 View    Result Date: 1/4/2021  No acute cardiopulmonary abnormality.  Electronically Signed By-Misti Spain MD On:1/4/2021 6:46 PM This report was finalized on [id number] by  Misti Spain MD.    Ct Angiogram Head    Result Date: 1/5/2021   1. No obvious acute large vessel cerebral arterial occlusions appreciated. 2. Complete occlusion, left internal carotid artery, age indeterminate. 3. Absence versus occlusion, terminal most left vertebral artery.  The right vertebral artery is widely patent and dominant in caliber. The basilar artery is also widely patent. 4. Moderate grade stenosis, prevertebral proximal left subclavian artery. 5. Additional findings, both vascular and nonvascular, as described above detail.  Electronically Signed By-Heath Allan MD On:1/5/2021 7:53 AM This report was finalized on [id number] by  Heath Allan MD.    Ct Head Without Contrast Stroke Protocol    Result Date: 1/4/2021  No acute intracranial abnormality.. There is remote infarct at the right frontal lobe. There is mild age-appropriate volume loss and there is a moderate amount of low-density in the periventricular subcortical white matter. This low density is nonspecific, but most commonly seen with chronic small vessel ischemic change. Brain MRI is more sensitive to evaluate for acute or subacute infarcts and to evaluate for intracranial metastatic disease.  Electronically Signed By-Misti Spain MD On:1/4/2021 6:08 PM This report was finalized on [id number] by  Misti Spain MD.      I reviewed the patient's new clinical results.      ________________________________________________________     PROBLEM LIST:    Speech disturbance    TIA (transient ischemic attack)          Assessment/Plan   ASSESSMENT/PLAN:  1. Acute dysarthria. History is unclear as pt disagrees with his caregiver and feels he is at baseline.   - CT head: No acute intracranial abnormality. Remote infarct at the right frontal lobe.  Chronic small vessel ischemic change  - CTA head and neck: No obvious acute large vessel cerebral arterial occlusions. . Complete occlusion, left internal carotid artery, age indeterminate (likely chronic). Absence versus occlusion, terminal most left vertebral artery. The right vertebral and basilar artery are widely patent and dominant in caliber. Images reviewed: Left ICA occlusion, right PCA focal severe stenosis. Focal area of stenosis in the right MCA just past the M2  segment  - MRI brain: No acute intracranial abnormality. Advanced chronic small vessel ischemic change. Chronic right frontal lobe infarct. Redemonstration of left internal carotid artery occlusion as seen on CTA. Small left mastoid effusion. Images reviewed: Motion artifact. No acute stroke. Advanced chronic small vessel ischemic disease.   - Echo: pending  - EKG: Sinus rhythm. Low voltage, extremity leads. Probable lateral infarct, old  - Labs: A1C: 6.5, B12: 573, LDL: 30, TSH: 3.62, UA: pending  - Antithrombotics: Continue home Eliquis (if confirmed to be an active home med), add Plavix 75mg daily due to advanced intracranial atherosclerotic disease  - Statin: Lipitor 80mg qhs  - PT/OT/ST as appropriate, Neuro checks per protocol, DVT prophylaxis, Stroke education    2. Advanced intracranial atherosclerotic disease with multiple focal areas of high grade stenosis and left ICA occlusion. Daughter reports ICA disease is chronic. Pt previously deemed not a candidate for surgery.   - Plavix 75mg daily, Lipitor 80mg qhs. Will defer DAPT for now considering long term anticoagulation with Eliquis and increased risk for bleeding.   - Pt not likely a surgical candidate due to age and underlying cognitive changes, but consider elective outpt evaluation with neuro-interventionalist to discuss treatment options and stenting if pt desires.     3. Chronic anticoagulation with Eliquis  - History unknown  - Okay to continue from neuro standpoint if this is an active prescription    4. Chronic right frontal lobe stroke  - Fall precautions  - Modification of stroke risk factors: Blood pressure should be less than 130/80 outpatient, HbA1c less than 6.5, LDL less than 70; b12>500 and smoking cessation if applicable. We would be grateful if the primary team / primary care physician would keep a close watch on the above targets.  - Stroke education  - Follow up with neurologist of choice    Will sign off pending echo. Please f/u on  UA. Please call with questions or concerns.       I discussed the patient's findings and my recommendations with patient, nursing staff and consulting provider    KARSON Singletary  01/05/21  11:16 EST

## 2021-01-05 NOTE — PLAN OF CARE
"Goal Outcome Evaluation:  Plan of Care Reviewed With: patient     Outcome Summary: Pt is 79 yo male admitted for dysarthria from custodial. CT and MRI (-) for acute infarct. Remote infarct to R frontal lobe.  Pt pleasantly confused, and difficult to obtain PLOF.  He is oriented to self, and states he is at \"[facility]\" and is loquatious but unable to maintain attention to task.  Pt requires Min A for functional transfer and mobility, and demos poor safety awareness. He would benefit from IP rehab to addressed safety impairments. PPE: Gloves, mask, eyewear  "

## 2021-01-05 NOTE — PROGRESS NOTES
Discharge Planning Assessment   Alin     Patient Name: Lucio Valladares  MRN: [id number]  Today's Date: 1/5/2021    Admit Date: 1/4/2021    Discharge Needs Assessment     Row Name 01/05/21 1257       Living Environment    Lives With  other (see comments)    Unique Family Situation  Lives at [facility]    Current Living Arrangements  home/apartment/condo    Primary Care Provided by  self    Provides Primary Care For  no one, unable/limited ability to care for self    Family Caregiver if Needed  child(krista), adult    Quality of Family Relationships  helpful    Able to Return to Prior Arrangements  yes       Resource/Environmental Concerns    Resource/Environmental Concerns  none    Transportation Concerns  car, none       Transition Planning    Patient/Family Anticipates Transition to  inpatient rehabilitation facility    Patient/Family Anticipated Services at Transition  none    Transportation Anticipated  family or friend will provide;health plan transportation       Discharge Needs Assessment    Readmission Within the Last 30 Days  no previous admission in last 30 days    Equipment Currently Used at Home  walker, rolling;wheelchair, motorized    Concerns to be Addressed  denies needs/concerns at this time    Anticipated Changes Related to Illness  none    Equipment Needed After Discharge  none        Discharge Plan     Row Name 01/05/21 1258       Plan    Plan  D/C Plan: Pending rehab choices from daughter. From [facility] on [address]    Provided Post Acute Provider List?  Yes    Post Acute Provider List  Inpatient Rehab    Delivered To  Support Person    Support Person  Dustin shah    Method of Delivery  Telephone;Other (comment) emailed to [email or web address]    Patient/Family in Agreement with Plan  yes    Plan Comments   spoke to patient at bedside wearing mask and goggles and keeping distance greater than 6 feet and spent less than 15 minutes in room.  Patient with intermittent confusion and unable to answer all questions about living situation clearly. CM called and spoke to patient's daughter, Dustin Griffith. Patient lives at [facility] and has a senior helper 1-2 days/week. [facility]denies any difficulty affording meds. CM reviewed with daughter PT recommendations for rehab-rehab list emailed to daughter at [email or web address] choices from daughter. Daughter states patient's SW with [facility] is Mack[facility] ([phone]) and nurse is Yuliya. Barrier to D/C: OT pending, medication adjustments.          Expected Discharge Date and Time     Expected Discharge Date Expected Discharge Time    Jan 6, 2021         Demographic Summary     Row Name 01/05/21 1256       General Information    Admission Type  observation    Arrived From  emergency department    Referral Source  admission list    Reason for Consult  discharge planning    Preferred Language  English     Used During This Interaction  no        Functional Status     Row Name 01/05/21 1257       Functional Status    Usual Activity Tolerance  fair    Current Activity Tolerance  fair       Functional Status, IADL    Medications  assistive person    Meal Preparation  assistive person    Housekeeping  assistive person    Laundry  assistive person    Shopping  assistive person       Mental Status    General Appearance WDL  WDL       Mental Status Summary    Mental Status Comments  patient with intermittent confusion        Patient Forms     Row Name 01/05/21 1309       Patient Forms    Important Message from Medicare (IMM)  -- WHITLEY 1/4/21 by registration            Jessica Irvin

## 2021-01-05 NOTE — ED PROVIDER NOTES
Subjective   History of Present Illness  Context: 80-year-old male presents with some slurred speech.  This apparently started around 4:00 today.  Patient feels like his speech is at baseline although family thought it was different.  He reports no headache.  No visual difficulty no localized numbness weakness paresthesia of extremities.  Location: Speech  Quality: Slurred  Duration: Today  Timing: Constant  Severity: Mild   Modifying factors: None reported  Associated signs and symptoms: None reported    Review of Systems   Constitutional: Negative for fever and unexpected weight change.   HENT: Negative for congestion and sore throat.    Eyes: Negative for pain.   Respiratory: Negative for cough and shortness of breath.    Cardiovascular: Negative for chest pain and leg swelling.   Gastrointestinal: Negative for abdominal pain, diarrhea and vomiting.   Genitourinary: Negative for dysuria and urgency.   Musculoskeletal: Negative for back pain.   Skin: Negative for rash.   Neurological: Positive for speech difficulty. Negative for dizziness, numbness and headaches.   Psychiatric/Behavioral: Negative for confusion.       History reviewed. No pertinent past medical history.  Past medical history positive for stroke diabetes hyperlipidemia  No Known Allergies    History reviewed. No pertinent surgical history.    History reviewed. No pertinent family history.  Social history does not smoke  Social History     Socioeconomic History   • Marital status: Unknown     Spouse name: Not on file   • Number of children: Not on file   • Years of education: Not on file   • Highest education level: Not on file     Unsure of his medications.  [facility] did find out that he apparently is on Eliquis but could not find out his other medications      Objective   Physical Exam  80-year-old male awake alert.  Generally well-developed well-nourished.  Pupils equal round react light.  Extraocular muscles are intact visual  fields full oropharynx clear there is no facial asymmetry neck supple chest clear cardiovascular regular rate and rhythm.  Abdomen soft nontender neurologic exam hands without pronator drift finger-nose intact speech he is able to read sentences without difficulty.  He is able to identify objects.  NIH stroke scale 0  Procedures           ED Course      Results for orders placed or performed during the hospital encounter of 01/04/21   Comprehensive Metabolic Panel    Specimen: Arm, Right; Blood   Result Value Ref Range    Glucose 151 (H) 65 - 99 mg/dL    BUN 24 (H) 8 - 23 mg/dL    Creatinine 1.22 0.76 - 1.27 mg/dL    Sodium 137 136 - 145 mmol/L    Potassium 4.6 3.5 - 5.2 mmol/L    Chloride 105 98 - 107 mmol/L    CO2 19.0 (L) 22.0 - 29.0 mmol/L    Calcium 9.9 8.6 - 10.5 mg/dL    Total Protein 7.4 6.0 - 8.5 g/dL    Albumin 4.20 3.50 - 5.20 g/dL    ALT (SGPT) 12 1 - 41 U/L    AST (SGOT) 17 1 - 40 U/L    Alkaline Phosphatase 102 39 - 117 U/L    Total Bilirubin 0.8 0.0 - 1.2 mg/dL    eGFR Non African Amer 57 (L) >60 mL/min/1.73    eGFR  African Amer 69 >60 mL/min/1.73    Globulin 3.2 gm/dL    A/G Ratio 1.3 g/dL    BUN/Creatinine Ratio 19.7 7.0 - 25.0    Anion Gap 13.0 5.0 - 15.0 mmol/L   Protime-INR    Specimen: Arm, Right; Blood   Result Value Ref Range    Protime 11.8 (H) 9.6 - 11.7 Seconds    INR 1.08 0.93 - 1.10   aPTT    Specimen: Arm, Right; Blood   Result Value Ref Range    PTT 30.0 24.0 - 31.0 seconds   Troponin    Specimen: Arm, Right; Blood   Result Value Ref Range    Troponin T <0.010 0.000 - 0.030 ng/mL   CBC Auto Differential    Specimen: Arm, Right; Blood   Result Value Ref Range    WBC 8.20 3.40 - 10.80 10*3/mm3    RBC 4.82 4.14 - 5.80 10*6/mm3    Hemoglobin 15.1 13.0 - 17.7 g/dL    Hematocrit 44.7 37.5 - 51.0 %    MCV 92.8 79.0 - 97.0 fL    MCH 31.4 26.6 - 33.0 pg    MCHC 33.8 31.5 - 35.7 g/dL    RDW 15.2 12.3 - 15.4 %    RDW-SD 49.0 37.0 - 54.0 fl    MPV 8.9 6.0 - 12.0 fL    Platelets 268 140 - 450  10*3/mm3    Neutrophil % 62.6 42.7 - 76.0 %    Lymphocyte % 28.2 19.6 - 45.3 %    Monocyte % 7.4 5.0 - 12.0 %    Eosinophil % 0.6 0.3 - 6.2 %    Basophil % 1.2 0.0 - 1.5 %    Neutrophils, Absolute 5.10 1.70 - 7.00 10*3/mm3    Lymphocytes, Absolute 2.30 0.70 - 3.10 10*3/mm3    Monocytes, Absolute 0.60 0.10 - 0.90 10*3/mm3    Eosinophils, Absolute 0.10 0.00 - 0.40 10*3/mm3    Basophils, Absolute 0.10 0.00 - 0.20 10*3/mm3    nRBC 0.1 0.0 - 0.2 /100 WBC   ECG 12 Lead   Result Value Ref Range    QT Interval 396 ms   Type & Screen    Specimen: Arm, Right; Blood   Result Value Ref Range    ABO Type O     RH type Positive     Antibody Screen Negative     T&S Expiration Date 1/7/2021 11:59:59 PM    Light Blue Top   Result Value Ref Range    Extra Tube hold for add-on    Lavender Top   Result Value Ref Range    Extra Tube hold for add-on    Gold Top - SST   Result Value Ref Range    Extra Tube Hold for add-ons.      Xr Chest 1 View    Result Date: 1/4/2021  No acute cardiopulmonary abnormality.  Electronically Signed By-Misti Spain MD On:1/4/2021 6:46 PM This report was finalized on [id number] by  Misti Spain MD.    Ct Head Without Contrast Stroke Protocol    Result Date: 1/4/2021  No acute intracranial abnormality.. There is remote infarct at the right frontal lobe. There is mild age-appropriate volume loss and there is a moderate amount of low-density in the periventricular subcortical white matter. This low density is nonspecific, but most commonly seen with chronic small vessel ischemic change. Brain MRI is more sensitive to evaluate for acute or subacute infarcts and to evaluate for intracranial metastatic disease.  Electronically Signed By-Misti Spain MD On:1/4/2021 6:08 PM This report was finalized on [id number] by  Misti Spain MD.    Medications   sodium chloride 0.9 % flush 10 mL (has no administration in time range)   sodium chloride 0.9 % infusion (125 mL/hr Intravenous New Bag 1/4/21 1932)   iopamidol  "(ISOVUE-370) 76 % injection 100 mL (100 mL Intravenous Given 1/4/21 1807)     /70   Pulse 67   Temp 98.9 °F (37.2 °C) (Oral)   Resp 18   Ht 180.3 cm (71\")   Wt 92.1 kg (203 lb)   SpO2 98%   BMI 28.31 kg/m²                                        MDM  Chart review: No previous records to review  Comorbidity: As per past history  Differential: TIA, stroke, hypoglycemia,  My EKG interpretation: Sinus rhythm low voltage extremity leads no previous to compare to  Lab: Comprehensive metabolic panel glucose 151 BUN 24 creatinine 1.22, INR 1.0, PTT 30, CBC normal, troponin negative  Radiology: I reviewed all x-rays.  CT scan of head showed no acute intracranial abnormality.  There is evidence of remote infarct in the right frontal lobe mild age-appropriate volume loss and moderate amount of low-density periventricular white matter disease.  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  He was started IV fluids.  He is not a TPA candidate due to NIH stroke scale of 0.  Also was determined that he is probably on Eliquis.  Also apparently had given history to radiology staff some symptoms of weakness for about 4 days.  Patient was discussed with Dr Hwang.  He be brought into the hospitalist.  He was discussed with the PA.  Admit to stroke unit for observation continued care as needed.  He will be admitted as presumed TIA based on history speech change today although the patient feels his speech is at baseline now.  Patient was evaluated using appropriate PPE      Final diagnoses:   TIA (transient ischemic attack)            Jay Treviño MD  01/04/21 2009    "

## 2021-01-05 NOTE — SIGNIFICANT NOTE
Orders received via stroke protocol.  Pt passed the swallow screen, is on a diet and both CT and MRI are negative for acute stroke. ST to sign off at this time. Please re-consult if needed. Thank you.

## 2021-01-05 NOTE — PLAN OF CARE
Goal Outcome Evaluation:  Plan of Care Reviewed With: patient     Outcome Summary: 81 yo male adm for possible TIA. 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 Pt was reportedly having some dysarthria at time of admission, but this has now resolved. Pt resides at [facility]. He is able to come to stand w/ min assist, as he demonstrates posterior lean w/ coming to stand. He ambulates 50 ft w/ rw and cga, again due to intermittent posterior lean. Recommend return to [facility] when medically stable. Will follow 3xwk to work toward increased safety of ambulation, as pt remains markedly impulsive. PPE: gloves, mask, goggles.